# Patient Record
Sex: FEMALE | URBAN - METROPOLITAN AREA
[De-identification: names, ages, dates, MRNs, and addresses within clinical notes are randomized per-mention and may not be internally consistent; named-entity substitution may affect disease eponyms.]

---

## 2020-11-24 ENCOUNTER — APPOINTMENT (OUTPATIENT)
Dept: URBAN - METROPOLITAN AREA CLINIC 259 | Age: 75
Setting detail: DERMATOLOGY
End: 2020-11-25

## 2020-11-24 DIAGNOSIS — L29.8 OTHER PRURITUS: ICD-10-CM

## 2020-11-24 DIAGNOSIS — L82.0 INFLAMED SEBORRHEIC KERATOSIS: ICD-10-CM

## 2020-11-24 DIAGNOSIS — L57.8 OTHER SKIN CHANGES DUE TO CHRONIC EXPOSURE TO NONIONIZING RADIATION: ICD-10-CM

## 2020-11-24 DIAGNOSIS — L56.5 DISSEMINATED SUPERFICIAL ACTINIC POROKERATOSIS (DSAP): ICD-10-CM

## 2020-11-24 DIAGNOSIS — L57.0 ACTINIC KERATOSIS: ICD-10-CM

## 2020-11-24 DIAGNOSIS — D18.0 HEMANGIOMA: ICD-10-CM

## 2020-11-24 PROBLEM — D18.01 HEMANGIOMA OF SKIN AND SUBCUTANEOUS TISSUE: Status: ACTIVE | Noted: 2020-11-24

## 2020-11-24 PROCEDURE — OTHER LIQUID NITROGEN: OTHER

## 2020-11-24 PROCEDURE — OTHER PRESCRIPTION: OTHER

## 2020-11-24 PROCEDURE — 17110 DESTRUCT B9 LESION 1-14: CPT

## 2020-11-24 PROCEDURE — 99214 OFFICE O/P EST MOD 30 MIN: CPT | Mod: 25

## 2020-11-24 PROCEDURE — OTHER COUNSELING: OTHER

## 2020-11-24 PROCEDURE — 17000 DESTRUCT PREMALG LESION: CPT | Mod: 59

## 2020-11-24 RX ORDER — TRIAMCINOLONE ACETONIDE 1 MG/G
CREAM TOPICAL
Qty: 1 | Refills: 0 | Status: ERX | COMMUNITY
Start: 2020-11-24

## 2020-11-24 ASSESSMENT — LOCATION DETAILED DESCRIPTION DERM
LOCATION DETAILED: RIGHT SUPERIOR MEDIAL UPPER BACK
LOCATION DETAILED: SUPERIOR THORACIC SPINE
LOCATION DETAILED: LEFT MEDIAL UPPER BACK
LOCATION DETAILED: RIGHT PROXIMAL PRETIBIAL REGION
LOCATION DETAILED: RIGHT FOREHEAD
LOCATION DETAILED: LEFT PROXIMAL PRETIBIAL REGION
LOCATION DETAILED: MIDDLE STERNUM

## 2020-11-24 ASSESSMENT — LOCATION ZONE DERM
LOCATION ZONE: TRUNK
LOCATION ZONE: FACE
LOCATION ZONE: LEG

## 2020-11-24 ASSESSMENT — LOCATION SIMPLE DESCRIPTION DERM
LOCATION SIMPLE: LEFT UPPER BACK
LOCATION SIMPLE: RIGHT UPPER BACK
LOCATION SIMPLE: LEFT PRETIBIAL REGION
LOCATION SIMPLE: UPPER BACK
LOCATION SIMPLE: RIGHT PRETIBIAL REGION
LOCATION SIMPLE: RIGHT FOREHEAD
LOCATION SIMPLE: CHEST

## 2020-11-24 NOTE — PROCEDURE: LIQUID NITROGEN
Detail Level: Zone
Render Note In Bullet Format When Appropriate: No
Number Of Freeze-Thaw Cycles: 1 freeze-thaw cycle
Consent: The patient's consent was obtained including but not limited to risks of crusting, scabbing, blistering, scarring, darker or lighter pigmentary change, recurrence, incomplete removal and infection.
Medical Necessity Clause: This procedure was medically necessary because the lesions that were treated were:
Detail Level: Detailed
Post-Care Instructions: I reviewed with the patient in detail post-care instructions. Patient is to wear sunprotection, and avoid picking at any of the treated lesions. Pt may apply Vaseline to crusted or scabbing areas.
Duration Of Freeze Thaw-Cycle (Seconds): 5
Medical Necessity Information: It is in your best interest to select a reason for this procedure from the list below. All of these items fulfill various CMS LCD requirements except the new and changing color options.
Total Number Of Lesions Treated: 14
Render Post Care In The Note?: yes

## 2022-07-18 ENCOUNTER — TRANSCRIBE ORDERS (OUTPATIENT)
Dept: OTHER | Age: 77
End: 2022-07-18

## 2022-07-18 DIAGNOSIS — G51.0 BELL'S PALSY: Primary | ICD-10-CM

## 2022-07-18 DIAGNOSIS — R47.89 OTHER SPEECH DISTURBANCES: ICD-10-CM

## 2022-08-09 ENCOUNTER — TELEPHONE (OUTPATIENT)
Dept: SPEECH THERAPY | Facility: CLINIC | Age: 77
End: 2022-08-09

## 2022-08-09 NOTE — TELEPHONE ENCOUNTER
If this patient would like to reschedule her evaluation with Yandy Woodson from 9/2 to 8/10.  You can schedule her anywhere in the ENT block that is available per Yandy Woodson

## 2022-09-02 ENCOUNTER — THERAPY VISIT (OUTPATIENT)
Dept: SPEECH THERAPY | Facility: CLINIC | Age: 77
End: 2022-09-02
Attending: PHYSICIAN ASSISTANT
Payer: COMMERCIAL

## 2022-09-02 DIAGNOSIS — R13.11 ORAL PHASE DYSPHAGIA: ICD-10-CM

## 2022-09-02 DIAGNOSIS — R47.1 DYSARTHRIA: Primary | ICD-10-CM

## 2022-09-02 PROCEDURE — 92507 TX SP LANG VOICE COMM INDIV: CPT | Mod: GN | Performed by: SPEECH-LANGUAGE PATHOLOGIST

## 2022-09-02 PROCEDURE — 92522 EVALUATE SPEECH PRODUCTION: CPT | Mod: GN | Performed by: SPEECH-LANGUAGE PATHOLOGIST

## 2022-09-02 NOTE — DISCHARGE INSTRUCTIONS
[email or web address]    Eyelid weight placement     Management of Flaccid Stage    Flaccid Massage  The point massage is to make the muscles more pliable for exercise, improve circulation, and prevent fluid from building up the hardening.    Using the finger pads of your first and index fingers, use firm but gently pressure to massage the following areas of both sides of the face for the designated length of time:  Forehead- 2 minutes   Temples- 1 minute   Cheek- 3 minutes (upper cheek moving back towards the ear for 1 minute, mid cheek moving back towards the ear for 1 minute, and lower cheek moving back towards the ear for 1 minute)  Chin- 1 minute     Key Points As We Consider Exercise  We will never attempt to exercise an area that is not yet innervated (i.e., has no movement).  Exercises should be slow and controlled.   Facial palsy exercises are a marathon, not a sprint.    Prioritize quality over quantity.   Never exercise to the point of fatigue.   Exercises should not hurt or make you feel uncomfortable afterward.    Don't get discouraged!!      Eye Management:    You may be having difficulty closing one or both of your eyes.  The inability to blink, of blink fully, may cause your eye to be dry and sore.  This can be painful and blur your vision. If you do not care for your eye it can cause infection or ulcers which may have a permanent impact on your vision.  It is important for you to take care of your eye at this stage and you may want to wear glasses, sunglasses, and avoid air conditioning that can cause excessive dryness.      Your eye doctor or general practitioner should have prescribed you with drops or spray to manage dryness during the day and gel ointment for use at night.  You should follow their recommendations consistently.      It is also important to establish eye closure at night to allow you to sleep and in order to prevent dryness and possible damage.  To establish closure, you  may want to consider using the following:  Taping (consider using 3M transpore tape 2.10 inch tape, Nexcare Blue Sensitive Skin Tape, MedVance Silicone Tape)  Moisture chamber goggles   Adhesive eye patches     Eyelid Stretches- complete 2x/day as able  Actively close your eyes, and then look down at your knees.  Looking down may feel awkward as you are reversing the brain's protective mechanism of having the eye roll backward.    Hold the eye's closed for 20-30 seconds  Relax the lid back to the open eye position   Repeat 3x    Facial Exercises to Improve Eye Closure:    Active Assisted Eye Closure   Keep your head at chin level  Look down and keep looking down while you close your eyes  While closing both eyes, carefully and gently use a finger to assist the eyelid to completely close    Squeeze both eyes tightly shut for 5 seconds  Try to squeeze with your upper facial muscles only, keeping the lower face as relaxed as possible  Relax open   Repeat 10-20x; discontinue before 20 if the muscles feel tired    Exercises to Improve Lip Function     Active Assisted Pucker   Use the thumbs and index fingers to assist the lips into a symmetrical pucker at the same time that you use your muscles to pucker   WIth the lined area between the lips and nose centered with the bridge of the nose   Pretend to suck in through a skinny straw  Hold for 3-5 seconds  Relax   Repeat 10-20x; discontinue before 20 if the muscles feel tired

## 2022-09-02 NOTE — PROGRESS NOTES
Speech-Language Pathology Department   EVALUATION  [facility]  FACIAL PARALYSIS EVALUATION      09/02/22 1000   Visit Type   Visit Type Initial       Present No   General Patient Information   Start Of Care Date 09/02/22   Referring Physician Dominique Spivey PA-C   Orders Eval And Treat   Orders Date 07/18/22   Orders Comment Bell's palsy   Medical Diagnosis Other speech disturbances   Onset Of Illness/injury Or Date Of Surgery 06/17/22   Precautions/limitations No Known Precautions/limitations   Special Instructions None   Surgical/Medical History Reviewed Yes   Pertinent History Of Current Problem Pt is a 77 year old female with a 6/17/2022 onset of Bell's palsy.  Pt reports little improvement since that time.   Functional Problems Anterior loss (improving), pocketing (improving), biting lip/cheek, slurred speech, dry eye   Previous Treatment None   General Health Major life stress at onset of symptoms   Sensory Changes Food does not taste as good   Pain Description Not painful- eye bothersome   Hearing Changes Increased sensitivity    Eye Problems Dry eye   Patient's Concerns other (comment)  (that it might never go away)   Patient/Family Goals Try and improve the face; get rid of it period.   Evaluation Results: Resting Tone and Symmetry/Oral status   Palpebral Fissure - Type of Eye Tone  Normal  (0)   Nasolabial Fold  Less Pronounced  (1.0)   Lips  - Type of Lip Tone  Drooped  (1.0)   Area of Wrinkles Forehead;Eye;Cheek   Type of Forehead Wrinkles Less   Type of Eye Wrinkles Less   Type of Cheek/Mouth Wrinkles Less   Evaluation Results: Forehead Elevation   Evaluation Results: Forehead Elevation 1.0   Forehead Strength Rating % 0%   Forehead General Severity of Synkinesis   none   Evaluation Results: Minimal Effort Eye Closure    Evaluation Results: Minimal Effort Eye Closure  2.0   Complete No   Strength Rating % 10%   General Severity  of Synkinesis   none   Evaluation Results: Open Mouth Smile    Evaluation Results: Open Mouth Smile  1.0   Open Smile Strength Rating % 0%   Open Smile General Severity of Synkinesis   none   Evaluation Results: Closed Mouth Smile    Closed Mouth Smile Strength Rating % 0%   Closed Mouth Smile General Severity of Synkinesis   none   Evaluation Results: Snarl   Evaluation Results Snarl 1.0   Snarl Strength Rating % 0%   Snarl General Severity of Synkinesis   none   Evaluation Results: Pucker   Evaluation Results: Pucker 2.0   Strength Rating % very slight inferiorly   General Severity of Synkinesis   none   Evaluation Results: Tongue Movement   Tongue Protrusion Deviates to left   Evaluation Results: Speech Function   Evaluation Results: Speech Function Deviation of lips during speech to left, stronger side;Reduced lip movement on the right   General Severity of Synkinesis with Sound-Lip Rounding None   General Severity of Synkinesis with Sound-Lip Pressure none   General Therapy Interventions   Planned Therapy Interventions Oral Stage Swallowing Therapy;Facial Therapy;Improve Facial Tone and Function;Improve Speech Intelligibilty   Clinical Impressions   Criteria for Skilled Therapeutic Interventions Met yes;treatment indicated   Facial Grading Score 18/100   Communication Diagnosis Non-verbal communication impairment, mild dysarthria   Swallowing Diagnosis Mild oral phase dysphagia   Rehab Potential good to achieve stated therapy goal(s)   Demonstrates Need for Referral to ENT- Avel Lara   Therapy Frequency  2 times;per month  (for the first month, then monthly)   Predicted Duration of Therapy Intervention (days/weeks) up to 6 months   Risks and Benefits of Treatment have been explained yes   Patient, family and/or staff in agreement yes   Facial Paralysis Goals   Facial Paralysis Goals 1;2;3;4   Facial Paralysis Goal 1   Goal Identifier 1   Goal Description Pt will increase her FGS reflecting gains in resting  symmetry, symmetry of movement, and reduced synkinesis (if present) when compared to her status noted at the initial evaluation.   Goal Progress Goal ongoing.  FGS completed as part of the initial evaluation with pt scoring 18/100.  Pt trained on FGS, therapy as it relates to face, and sequale of recovery.  Pt reported understanding and agreement.   Target Date 12/01/22   Facial Paralysis Goal 2   Goal Identifier 2   Goal Description Pt will demonstrate complete eye closure when compared to her status of 4 mm open as measured during the initial evaluation.   Goal Progress Goal ongoing.  Pt trained on eyelid stretches x1 and eye closure exercises listed below.  Pt indpendent with execution upon session completion.   Target Date 12/01/22   Facial Paralysis Goal 3   Goal Identifier 3   Goal Description Pt will report a 25% improvement in articulartory precision when compared to her status at the initial evaluation.   Goal Progress Goal ongoing.  Pt trained on rate control and overarticulation as compensatory strategies.  Pt reported understanding and agreement.  Pt notes  has hearing loss so it is more important than ever to implement strategies.   Target Date 12/01/22   Facial Paralysis Goal 4   Goal Identifier 4   Goal Description Pt will increase her FaCE Instrument score by 10 points reflecting gains in physical functioning and acceptance when compared to her score taken during the initial evlaluation.   Goal Progress Goal ongoing.  FaCE Instrument administered with pt socring 26/100.   Target Date 12/01/22   Total Evaluation Time   Sound production (artic, phonology, apraxia, dysarthria) Minutes (86928) 60   Total Evaluation Time 60     Thank you for the referral of Alisson Roberts.  If you have any questions about this report, please contact me using the information below.        Yandy Woodson MS CCC-SLP  Speech Language Pathologist   Clinical Specialist Level 3    [facility]  Dept. of Otolaryngology  Department of Rehabilitation services  [address] [place] [id number]    Email: [email or web address]  Voicemail: [phone]  Gender Pronouns: she/her  Employed by [employer]

## 2022-09-02 NOTE — PROGRESS NOTES
Speech-Language Pathology Department   [facility]  [facility]  FACIAL PARALYSIS EVALUATION      09/02/22 1000   Visit Type   Visit Type Initial       Present No   General Patient Information   Start Of Care Date 09/02/22   Referring Physician Dominique Spivey PA-C   Orders Eval And Treat   Orders Date 07/18/22   Orders Comment Bell's palsy   Medical Diagnosis Other speech disturbances   Onset Of Illness/injury Or Date Of Surgery 06/17/22   Precautions/limitations No Known Precautions/limitations   Special Instructions None   Surgical/Medical History Reviewed Yes   Pertinent History Of Current Problem Pt is a 77 year old female with a 6/17/2022 onset of Bell's palsy.  Pt reports little improvement since that time.   Functional Problems Anterior loss (improving), pocketing (improving), biting lip/cheek, slurred speech, dry eye   Previous Treatment None   General Health Major life stress at onset of symptoms   Sensory Changes Food does not taste as good   Pain Description Not painful- eye bothersome   Hearing Changes Increased sensitivity    Eye Problems Dry eye   Patient's Concerns other (comment)  (that it might never go away)   Patient/Family Goals Try and improve the face; get rid of it period.   Evaluation Results: Resting Tone and Symmetry/Oral status   Palpebral Fissure - Type of Eye Tone  Normal  (0)   Nasolabial Fold  Less Pronounced  (1.0)   Lips  - Type of Lip Tone  Drooped  (1.0)   Area of Wrinkles Forehead;Eye;Cheek   Type of Forehead Wrinkles Less   Type of Eye Wrinkles Less   Type of Cheek/Mouth Wrinkles Less   Evaluation Results: Forehead Elevation   Evaluation Results: Forehead Elevation 1.0   Forehead Strength Rating % 0%   Forehead General Severity of Synkinesis   none   Evaluation Results: Minimal Effort Eye Closure    Evaluation Results: Minimal Effort Eye Closure  2.0   Complete No   Strength Rating % 10%   General Severity  of Synkinesis   none   Evaluation Results: Open Mouth Smile    Evaluation Results: Open Mouth Smile  1.0   Open Smile Strength Rating % 0%   Open Smile General Severity of Synkinesis   none   Evaluation Results: Closed Mouth Smile    Closed Mouth Smile Strength Rating % 0%   Closed Mouth Smile General Severity of Synkinesis   none   Evaluation Results: Snarl   Evaluation Results Snarl 1.0   Snarl Strength Rating % 0%   Snarl General Severity of Synkinesis   none   Evaluation Results: Pucker   Evaluation Results: Pucker 2.0   Strength Rating % very slight inferiorly   General Severity of Synkinesis   none   Evaluation Results: Tongue Movement   Tongue Protrusion Deviates to left   Evaluation Results: Speech Function   Evaluation Results: Speech Function Deviation of lips during speech to left, stronger side;Reduced lip movement on the right   General Severity of Synkinesis with Sound-Lip Rounding None   General Severity of Synkinesis with Sound-Lip Pressure none   General Therapy Interventions   Planned Therapy Interventions Oral Stage Swallowing Therapy;Facial Therapy;Improve Facial Tone and Function;Improve Speech Intelligibilty   Clinical Impressions   Criteria for Skilled Therapeutic Interventions Met yes;treatment indicated   Facial Grading Score 18/100   Communication Diagnosis Non-verbal communication impairment, mild dysarthria   Swallowing Diagnosis Mild oral phase dysphagia   Rehab Potential good to achieve stated therapy goal(s)   Demonstrates Need for Referral to ENT- Avel Lara   Therapy Frequency  2 times;per month  (for the first month, then monthly)   Predicted Duration of Therapy Intervention (days/weeks) up to 6 months   Risks and Benefits of Treatment have been explained yes   Patient, family and/or staff in agreement yes   Facial Paralysis Goals   Facial Paralysis Goals 1;2;3;4   Facial Paralysis Goal 1   Goal Identifier 1   Goal Description Pt will increase her FGS reflecting gains in resting  symmetry, symmetry of movement, and reduced synkinesis (if present) when compared to her status noted at the initial evaluation.   Target Date 12/01/22   Facial Paralysis Goal 2   Goal Identifier 2   Goal Description Pt will demonstrate complete eye closure when compared to her status of 4 mm open as measured during the initial evaluation.   Target Date 12/01/22   Facial Paralysis Goal 3   Goal Identifier 3   Goal Description Pt will report a 25% improvement in articulartory precision when compared to her status at the initial evaluation.   Target Date 12/01/22   Facial Paralysis Goal 4   Goal Identifier 4   Goal Description Pt will increase her FaCE Instrument score by 10 points reflecting gains in physical functioning and acceptance when compared to her score taken during the initial evlaluation.     Thank you for the referral of Alisson Roberts.  If you have any questions about this report, please contact me using the information below.        Yandy Woodson MS CCC-SLP  Speech Language Pathologist   Clinical Specialist Level 3    [facility]  Dept. of Otolaryngology  Department of Rehabilitation services  [address] [place] [id number]    Email: [email or web address]  Voicemail: [phone]  Gender Pronouns: she/her  Employed by [employer]

## 2022-09-08 NOTE — TELEPHONE ENCOUNTER
FUTURE VISIT INFORMATION      FUTURE VISIT INFORMATION:    Date: 9/28/2022    Time: 10:20am    Location: [facility]   REFERRAL INFORMATION:    Referring provider:  Yandy Woodson, SLP    Referring providers clinic:  [facility]    Reason for visit/diagnosis  NEW SUBSPEC FACIAL PARALYSIS    RECORDS REQUESTED FROM:       Clinic name Comments Records Status Imaging Status   [facility]  9/2/2022 - note and referral from  Yandy Woodson, SLP [facility]    [address] [place] [address]    [phone]   6/17/2022- Note from Joslyn Grossman MD        Care everywhere

## 2022-09-16 ENCOUNTER — THERAPY VISIT (OUTPATIENT)
Dept: SPEECH THERAPY | Facility: CLINIC | Age: 77
End: 2022-09-16
Payer: COMMERCIAL

## 2022-09-16 DIAGNOSIS — R47.1 DYSARTHRIA: Primary | ICD-10-CM

## 2022-09-16 DIAGNOSIS — R13.11 ORAL PHASE DYSPHAGIA: ICD-10-CM

## 2022-09-16 PROCEDURE — 92507 TX SP LANG VOICE COMM INDIV: CPT | Mod: GN | Performed by: SPEECH-LANGUAGE PATHOLOGIST

## 2022-09-16 NOTE — DISCHARGE INSTRUCTIONS
Management of Flaccid Stage     Flaccid Massage  The point massage is to make the muscles more pliable for exercise, improve circulation, and prevent fluid from building up the hardening.    Using the finger pads of your first and index fingers, use firm but gently pressure to massage the following areas of both sides of the face for the designated length of time:  Forehead- 2 minutes   Temples- 1 minute   Cheek- 3 minutes (upper cheek moving back towards the ear for 1 minute, mid cheek moving back towards the ear for 1 minute, and lower cheek moving back towards the ear for 1 minute)  Chin- 1 minute       Eye Management:    Try Glad Press and Seal to create a moisture chamber for your eye at night.       Eyelid Stretches  Use your fingers (or an eyelash curler) on your top eyelashes, and gently pull your top lid down over the lower lid.    Use your middle finger (or your other hand's finger) to then stretch your eyebrow up a little.  Hold the lid stretched for 20-30 seconds   Release the lid and then, relax the lid back to the open eye position  Repeat 3x    2. Eyelid Stretches  Actively close your eyes, and then look down at your knees.  Looking down may feel awkward as you are reversing the brain's protective mechanism of having the eye roll backward.    Hold the eye's closed for 20-30 seconds  Relax the lid back to the open eye position   Repeat 3x     Facial Exercises to Improve Eye Closure:     Active Assisted to Active Eye Closure  Keep your head at chin level  Look down and keep looking down while you close your eyes  While closing both eyes, carefully and gently use a finger to assist the eyelid to completely close    Squeeze both eyes tightly shut for 5 seconds  Try to squeeze with your upper facial muscles only, keeping the lower face as relaxed as possible  Remove the finger and keep squeezing the eyes shut for an addition 3-5 seconds   Relax open   Repeat 10-20x; discontinue before 20 if the muscles  feel tired      Exercises to Improve Lip Function      Active Assisted Pucker   Use the thumbs and index fingers to assist the lips into a symmetrical pucker at the same time that you use your muscles to pucker   WIth the lined area between the lips and nose centered with the bridge of the nose   Pretend to suck in through a skinny straw  Hold for 3-5 seconds  Relax   Repeat 10-20x; discontinue before 20 if the muscles feel tired

## 2022-09-28 ENCOUNTER — OFFICE VISIT (OUTPATIENT)
Dept: OTOLARYNGOLOGY | Facility: CLINIC | Age: 77
End: 2022-09-28
Payer: COMMERCIAL

## 2022-09-28 ENCOUNTER — PRE VISIT (OUTPATIENT)
Dept: OTOLARYNGOLOGY | Facility: CLINIC | Age: 77
End: 2022-09-28

## 2022-09-28 VITALS
HEART RATE: 87 BPM | SYSTOLIC BLOOD PRESSURE: 152 MMHG | BODY MASS INDEX: 24.27 KG/M2 | WEIGHT: 137 LBS | DIASTOLIC BLOOD PRESSURE: 69 MMHG | HEIGHT: 63 IN

## 2022-09-28 DIAGNOSIS — F41.9 ANXIETY: ICD-10-CM

## 2022-09-28 DIAGNOSIS — H53.483 VISUAL FIELD CONTRACTION, BILATERAL: ICD-10-CM

## 2022-09-28 DIAGNOSIS — G51.9 SEVENTH CRANIAL NERVE DISEASE OR SYNDROME: Primary | ICD-10-CM

## 2022-09-28 DIAGNOSIS — H57.819 BROW PTOSIS: ICD-10-CM

## 2022-09-28 DIAGNOSIS — H02.231 PARALYTIC LAGOPHTHALMOS OF RIGHT UPPER EYELID: ICD-10-CM

## 2022-09-28 PROCEDURE — 99204 OFFICE O/P NEW MOD 45 MIN: CPT | Performed by: OTOLARYNGOLOGY

## 2022-09-28 RX ORDER — ASPIRIN 81 MG/1
81 TABLET, CHEWABLE ORAL
COMMUNITY

## 2022-09-28 RX ORDER — LORAZEPAM 0.5 MG/1
0.5 TABLET ORAL ONCE
Qty: 1 TABLET | Refills: 0 | Status: SHIPPED | OUTPATIENT
Start: 2022-09-28 | End: 2022-09-28

## 2022-09-28 RX ORDER — ATORVASTATIN CALCIUM 40 MG/1
40 TABLET, FILM COATED ORAL
COMMUNITY

## 2022-09-28 RX ORDER — GABAPENTIN 100 MG/1
100 CAPSULE ORAL
COMMUNITY

## 2022-09-28 RX ORDER — ROSUVASTATIN CALCIUM 40 MG/1
40 TABLET, COATED ORAL DAILY
COMMUNITY
Start: 2022-07-07

## 2022-09-28 RX ORDER — NITROFURANTOIN 25; 75 MG/1; MG/1
CAPSULE ORAL
COMMUNITY
Start: 2022-09-06

## 2022-09-28 RX ORDER — IBUPROFEN 200 MG
200-600 TABLET ORAL
COMMUNITY

## 2022-09-28 RX ORDER — LEVOTHYROXINE SODIUM 88 UG/1
TABLET ORAL
COMMUNITY
Start: 2022-09-09

## 2022-09-28 RX ORDER — PREDNISONE 10 MG/1
TABLET ORAL
COMMUNITY
Start: 2022-06-27

## 2022-09-28 ASSESSMENT — PAIN SCALES - GENERAL: PAINLEVEL: NO PAIN (0)

## 2022-09-28 NOTE — PROGRESS NOTES
Facial Plastic and Reconstructive Surgery Consultation    Referring Provider:   Referred Self, MD  No address on file    HPI:   I had the pleasure of seeing Alisson Roberts today in clinic for consultation for right facial paralysis. Alisson Roberts is a 77 year old female who in June 20, 2022, suffered sudden onset right complete paralysis. She was diagnosed with Bell's and was treated with prednisone anti-virals. She did not have imaging at that time. She has been seeing Yandy Woodson and has had little recovery. She notes improvement in tone. She has no blink and incomplete eyelid closure. She uses drops and ointment, but states she does not make any tears.     She feels discouraged as she has not had significant improvement. She uses drops and ointment and seals her eye at night. She has difficulty with speech and eating but does believe that this is improving.     Of note she also describes significant trouble with visual field obstruction. 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 She notes now with the paralysis that her brow is down and she had hooding that gets in the way of sight. If she supports her brow she has improvement.     She also notes right nasal valve collapse and has been using breathe right strips at night.       Review Of Systems  ROS: 10 point ROS neg other than the symptoms noted above in the HPI.    Patient Active Problem List   Diagnosis     Lumbago     No past surgical history on file.  Current Outpatient Medications   Medication Sig Dispense Refill     aspirin (ASA) 81 MG chewable tablet Take 81 mg by mouth       atorvastatin (LIPITOR) 40 MG tablet Take 40 mg by mouth       gabapentin (NEURONTIN) 100 MG capsule Take 100 mg by mouth       ibuprofen (ADVIL/MOTRIN) 200 MG tablet Take 200-600 mg by mouth       levothyroxine (SYNTHROID/LEVOTHROID) 88 MCG tablet TAKE 1 TABLET BY MOUTH ONCE DAILY IN THE MORNING ON AN EMPTY STOMACH        nitroFURantoin macrocrystal-monohydrate (MACROBID) 100 MG capsule TAKE 1 CAPSULE BY MOUTH EVERY 12 HOURS FOR 5 DAYS       predniSONE (DELTASONE) 10 MG tablet TAKE 3 TABLETS BY MOUTH ONCE DAILY FOR 3 DAYS THEN 2 ONCE DAILY FOR 3 DAYS THEN 1 ONCE DAILY FOR 3 DAYS FOR A TOTAL OF 9 DAYS.       rosuvastatin (CRESTOR) 40 MG tablet Take 40 mg by mouth daily       Patient has no known allergies.  Social History     Socioeconomic History     Marital status: Unknown     Spouse name: Not on file     Number of children: Not on file     Years of education: Not on file     Highest education level: Not on file   Occupational History     Not on file   Tobacco Use     Smoking status: Not on file     Smokeless tobacco: Not on file   Substance and Sexual Activity     Alcohol use: Not on file     Drug use: Not on file     Sexual activity: Not on file   Other Topics Concern     Not on file   Social History Narrative     Not on file     Social Determinants of Health     Financial Resource Strain: Not on file   Food Insecurity: Not on file   Transportation Needs: Not on file   Physical Activity: Not on file   Stress: Not on file   Social Connections: Not on file   Intimate Partner Violence: Not on file   Housing Stability: Not on file     No family history on file.    PE:  Alert and Oriented, Answering Questions Appropriately  Atraumatic, Normocephalic, Face asymmetric at rest with complete right facial paralysis with no voluntary movement.   Skin: Flanagan 2  Facial Nerve Intact on left, right with complete facial paralysis with no voluntary movement  EOM's, PEERL, postive bell's phenomenon on right with no blink, evidence of 4mm of scleral show with attempt of eye closure, loss of tone in lower eyelid, the right brow cannot be elevated due to paralysis, the brow is resting below the orbital rim, if this is supported she can see much better  Nasal Exam: right lateral wall collapse, support of cheek with hand leads to improvement in  obstruction   Dermatochalasis with excess skin touching the eyelids in both eyes, worse on the right   Lids resting on eyelashes obstructing the temporal visual axis  Chin: Normal   Lips/Teeth/Toungue/Gums: Lips intact, Normal Dentition, Occlusion intact, Oral mucosa intact, no lesions/ulcerations/masses, Tongue mobile  Chest: No wheezing, cyanosis, or stridor  Card: Normal upper extremity pulses and capillary refill, not diaphoretic  Neuro/Psych: CN's 2-12 intact except for right facial nerve paralysis, Moves all extremities, ambulation in intact, positive affect, no notable muscle weakness            IMPRESSION:    Right idiopathic facial nerve paralysis  Right paralytic lagophthalmos  Right lowe eyelid ectropion  Right brow ptosis  Bilateral dermatochalasis upper eyelids      PLAN:      -MRI due to no recovery at 3 months  -visual field testing    Surgery:  Right brow elevation  Right upper eyelid weight  Bilateral upper eyelid blepharoplasty    Discussed all of the above with the patient, discussed surgery, discussed anticipated timeline of facial nerve recovery.       Photodocumentation was obtained.     I spent a total of 45 minutes in the care of patient Alisson Roberts during today's office visit. This time includes reviewing the patient's chart and prior history, obtaining a history, performing an examination and evaluation and counseling the patient. This time also includes ordering mediations or tests necessary in addition to communication to other member's of the patient's health care team. Time spent in documentation and care coordination is included.

## 2022-09-28 NOTE — LETTER
9/28/2022       RE: Alisson Roberts  [address]  [facility] [address]     Dear Colleague,    Thank you for referring your patient, Alisson Roberts, to the [facility] [place] [facility]. Please see a copy of my visit note below.    Facial Plastic and Reconstructive Surgery Consultation    Referring Provider:   Referred Self, MD  No address on file    HPI:   I had the pleasure of seeing Alisson Roberts today in clinic for consultation for right facial paralysis. Alisson Roberts is a 77 year old female who in June 20, 2022, suffered sudden onset right complete paralysis. She was diagnosed with Bell's and was treated with prednisone anti-virals. She did not have imaging at that time. She has been seeing Yandy Woodson and has had little recovery. She notes improvement in tone. She has no blink and incomplete eyelid closure. She uses drops and ointment, but states she does not make any tears.     She feels discouraged as she has not had significant improvement. She uses drops and ointment and seals her eye at night. She has difficulty with speech and eating but does believe that this is improving.     Of note she also describes significant trouble with visual field obstruction. 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 She notes now with the paralysis that her brow is down and she had hooding that gets in the way of sight. If she supports her brow she has improvement.     She also notes right nasal valve collapse and has been using breathe right strips at night.       Review Of Systems  ROS: 10 point ROS neg other than the symptoms noted above in the HPI.    Patient Active Problem List   Diagnosis     Lumbago     No past surgical history on file.  Current Outpatient Medications   Medication Sig Dispense Refill     aspirin (ASA) 81 MG chewable tablet Take 81 mg by mouth        atorvastatin (LIPITOR) 40 MG tablet Take 40 mg by mouth       gabapentin (NEURONTIN) 100 MG capsule Take 100 mg by mouth       ibuprofen (ADVIL/MOTRIN) 200 MG tablet Take 200-600 mg by mouth       levothyroxine (SYNTHROID/LEVOTHROID) 88 MCG tablet TAKE 1 TABLET BY MOUTH ONCE DAILY IN THE MORNING ON AN EMPTY STOMACH       nitroFURantoin macrocrystal-monohydrate (MACROBID) 100 MG capsule TAKE 1 CAPSULE BY MOUTH EVERY 12 HOURS FOR 5 DAYS       predniSONE (DELTASONE) 10 MG tablet TAKE 3 TABLETS BY MOUTH ONCE DAILY FOR 3 DAYS THEN 2 ONCE DAILY FOR 3 DAYS THEN 1 ONCE DAILY FOR 3 DAYS FOR A TOTAL OF 9 DAYS.       rosuvastatin (CRESTOR) 40 MG tablet Take 40 mg by mouth daily       Patient has no known allergies.  Social History     Socioeconomic History     Marital status: Unknown     Spouse name: Not on file     Number of children: Not on file     Years of education: Not on file     Highest education level: Not on file   Occupational History     Not on file   Tobacco Use     Smoking status: Not on file     Smokeless tobacco: Not on file   Substance and Sexual Activity     Alcohol use: Not on file     Drug use: Not on file     Sexual activity: Not on file   Other Topics Concern     Not on file   Social History Narrative     Not on file     Social Determinants of Health     Financial Resource Strain: Not on file   Food Insecurity: Not on file   Transportation Needs: Not on file   Physical Activity: Not on file   Stress: Not on file   Social Connections: Not on file   Intimate Partner Violence: Not on file   Housing Stability: Not on file     No family history on file.    PE:  Alert and Oriented, Answering Questions Appropriately  Atraumatic, Normocephalic, Face asymmetric at rest with complete right facial paralysis with no voluntary movement.   Skin: Flanagan 2  Facial Nerve Intact on left, right with complete facial paralysis with no voluntary movement  EOM's, PEERL, postive bell's phenomenon on right with no blink,  evidence of 4mm of scleral show with attempt of eye closure, loss of tone in lower eyelid, the right brow cannot be elevated due to paralysis, the brow is resting below the orbital rim, if this is supported she can see much better  Nasal Exam: right lateral wall collapse, support of cheek with hand leads to improvement in obstruction   Dermatochalasis with excess skin touching the eyelids in both eyes, worse on the right   Lids resting on eyelashes obstructing the temporal visual axis  Chin: Normal   Lips/Teeth/Toungue/Gums: Lips intact, Normal Dentition, Occlusion intact, Oral mucosa intact, no lesions/ulcerations/masses, Tongue mobile  Chest: No wheezing, cyanosis, or stridor  Card: Normal upper extremity pulses and capillary refill, not diaphoretic  Neuro/Psych: CN's 2-12 intact except for right facial nerve paralysis, Moves all extremities, ambulation in intact, positive affect, no notable muscle weakness            IMPRESSION:    Right idiopathic facial nerve paralysis  Right paralytic lagophthalmos  Right lowe eyelid ectropion  Right brow ptosis  Bilateral dermatochalasis upper eyelids      PLAN:      -MRI due to no recovery at 3 months  -visual field testing    Surgery:  Right brow elevation  Right upper eyelid weight  Bilateral upper eyelid blepharoplasty    Discussed all of the above with the patient, discussed surgery, discussed anticipated timeline of facial nerve recovery.       Photodocumentation was obtained.     I spent a total of 45 minutes in the care of patient Alisson Roberts during today's office visit. This time includes reviewing the patient's chart and prior history, obtaining a history, performing an examination and evaluation and counseling the patient. This time also includes ordering mediations or tests necessary in addition to communication to other member's of the patient's health care team. Time spent in documentation and care coordination is included.             Again, thank you for  allowing me to participate in the care of your patient.      Sincerely,    Alayna Lai MD

## 2022-09-28 NOTE — LETTER
Date:September 29, 2022      Patient was self referred, no letter generated. Do not send.        [facility] Health Information

## 2022-09-28 NOTE — NURSING NOTE
Photo and video documentation obtained.    Orders placed for MR of Brain and Face.  Pt requesting a one time dose of Ativan PO prior to imaging; prescription for one time dose sent to pt's pharmacy.    Pt needs VFT completed to obtain PA for bilateral upper eyelid blepharoplasty and right brow lift.    Will work to coordinate MR imaging and VFT on same day.    Joslyn Warner RN  9/28/2022 1:56 PM

## 2022-09-29 ENCOUNTER — TELEPHONE (OUTPATIENT)
Dept: OTOLARYNGOLOGY | Facility: CLINIC | Age: 77
End: 2022-09-29

## 2022-09-29 NOTE — TELEPHONE ENCOUNTER
Spoke with patient about scheduling MRIs ordered by Bj Lai. Also infomred patient od request to schedule MRIs on same day as visual field test. Patient stated she is going to think about this for a while prior to scheduling. Stated she wanted to consult with other people and would call back to schedule on her own time. Confirmed patient did not want to schedule something now, even if it would be far into the future. Confirmed patient would call and set up appointment on her own if she changed her mind.

## 2022-09-30 ENCOUNTER — TELEPHONE (OUTPATIENT)
Dept: OTOLARYNGOLOGY | Facility: CLINIC | Age: 77
End: 2022-09-30

## 2022-09-30 ENCOUNTER — TELEPHONE (OUTPATIENT)
Dept: OPHTHALMOLOGY | Facility: CLINIC | Age: 77
End: 2022-09-30

## 2022-09-30 NOTE — TELEPHONE ENCOUNTER
Spoke with patient regarding scheduling for a TECH ONLY Appointment for: VFT for Ming patient. Patient has decided to put off surgery and declined scheduling at this time. Patient will call for scheduling as needed in the future.-Per Patient

## 2022-10-03 ENCOUNTER — TELEPHONE (OUTPATIENT)
Dept: OTOLARYNGOLOGY | Facility: CLINIC | Age: 77
End: 2022-10-03

## 2022-10-03 NOTE — TELEPHONE ENCOUNTER
Called patient to follow up on coordinating schedules to have the MRI and VFT completed on the same day.    Pt wants to wait on all diagnostic testing at this time, including the MRI.  She wants to think about things prior to making a decision on surgery or imaging.    I asked pt if she was interested in pursuing the eyelid weight surgery and waiting on the browlift and blepharoplasty, and she said she wants to wait on all surgery at this time.    She said she will call when/if she decides to proceed with imaging and prior auth for surgery.    Pt has my direct dial for call back and said she will call if she chooses to proceed.    Joslyn Warner RN  10/3/2022 11:29 AM

## 2022-10-05 ENCOUNTER — THERAPY VISIT (OUTPATIENT)
Dept: SPEECH THERAPY | Facility: CLINIC | Age: 77
End: 2022-10-05
Payer: COMMERCIAL

## 2022-10-05 DIAGNOSIS — R47.1 DYSARTHRIA: Primary | ICD-10-CM

## 2022-10-05 DIAGNOSIS — R13.11 ORAL PHASE DYSPHAGIA: ICD-10-CM

## 2022-10-05 PROCEDURE — 92507 TX SP LANG VOICE COMM INDIV: CPT | Mod: GN | Performed by: SPEECH-LANGUAGE PATHOLOGIST

## 2022-10-05 NOTE — DISCHARGE INSTRUCTIONS
Management of Dry Mouth   Saline Gargle  1/4 tsp baking soda and 1/4 tsp salt in 1 cup of water   Gargle     Inhale Steam-- breathe in through your mouth  Breathe over a pot of boiling water   Breathe in the steam of a hot shower     Flaccid Massage  The point massage is to make the muscles more pliable for exercise, improve circulation, and prevent fluid from building up the hardening.    Using the finger pads of your first and index fingers, use firm but gently pressure to massage the following areas of both sides of the face for the designated length of time:  Forehead- 2 minutes   Temples- 1 minute   Cheek- 3 minutes (upper cheek moving back towards the ear for 1 minute, mid cheek moving back towards the ear for 1 minute, and lower cheek moving back towards the ear for 1 minute)  Chin- 1 minute          Eyelid Stretches  Use your fingers (or an eyelash curler) on your top eyelashes, and gently pull your top lid down over the lower lid.    Use your middle finger (or your other hand's finger) to then stretch your eyebrow up a little.  Hold the lid stretched for 20-30 seconds   Release the lid and then, relax the lid back to the open eye position  Repeat 3x     2. Eyelid Stretches  Actively close your eyes, and then look down at your knees.  Looking down may feel awkward as you are reversing the brain's protective mechanism of having the eye roll backward.    Hold the eye's closed for 20-30 seconds  Relax the lid back to the open eye position   Repeat 3x     Facial Exercises to Improve Eye Closure:     Active Assisted to Active Eye Closure  Keep your head at chin level  Look down and keep looking down while you close your eyes  While closing both eyes, carefully and gently use a finger to assist the eyelid to completely close    Squeeze both eyes tightly shut for 5 seconds  Try to squeeze with your upper facial muscles only, keeping the lower face as relaxed as possible  Remove the finger and keep squeezing the  eyes shut for an addition 3-5 seconds   Relax open   Repeat 10-20x; discontinue before 20 if the muscles feel tired      Exercises to Improve Lip Function      Active Assisted Pucker   Use the thumbs and index fingers to assist the lips into a symmetrical pucker at the same time that you use your muscles to pucker   WIth the lined area between the lips and nose centered with the bridge of the nose   Pretend to suck in through a skinny straw  Hold for 3-5 seconds  Relax   Repeat 10-20x; discontinue before 20 if the muscles feel tired    Exercises to Improve Smile     Active Assisted Snarl   Using one finger placed flat and vertically next to nose (with the tip of the finger even with the top of the nostril  Gently assist this area to move straight upward while you use your muscles on both sides to make a  stinky face   Wrinkle the nose hard, trying to expose your upper teeth or gums equally on both sides  Hold for 5 seconds  Relax   Repeat 10-20x; discontinue before 20 if the muscles feel tired

## 2022-11-16 ENCOUNTER — THERAPY VISIT (OUTPATIENT)
Dept: SPEECH THERAPY | Facility: CLINIC | Age: 77
End: 2022-11-16
Payer: COMMERCIAL

## 2022-11-16 DIAGNOSIS — R47.1 DYSARTHRIA: Primary | ICD-10-CM

## 2022-11-16 DIAGNOSIS — R13.11 ORAL PHASE DYSPHAGIA: ICD-10-CM

## 2022-11-16 PROCEDURE — 92507 TX SP LANG VOICE COMM INDIV: CPT | Mod: GN | Performed by: SPEECH-LANGUAGE PATHOLOGIST

## 2022-11-16 NOTE — DISCHARGE INSTRUCTIONS
Flaccid Massage  The point massage is to make the muscles more pliable for exercise, improve circulation, and prevent fluid from building up the hardening.    Using the finger pads of your first and index fingers, use firm but gently pressure to massage the following areas of both sides of the face for the designated length of time:  Forehead- 2 minutes   Temples- 1 minute   Cheek- 3 minutes (upper cheek moving back towards the ear for 1 minute, mid cheek moving back towards the ear for 1 minute, and lower cheek moving back towards the ear for 1 minute)  Chin- 1 minute          Eyelid Stretch  Use your fingers (or an eyelash curler) on your top eyelashes, and gently pull your top lid down over the lower lid.    Use your middle finger (or your other hand's finger) to then stretch your eyebrow up a little.  Hold the lid stretched for 20-30 seconds   Release the lid and then, relax the lid back to the open eye position  Repeat 3x     Facial Exercises to Improve Eye Closure:     Active Assisted to Active Eye Closure  Keep your head at chin level  Look down and keep looking down while you close your eyes  While closing both eyes, carefully and gently use a finger to assist the eyelid to completely close    Squeeze both eyes tightly shut for 3 seconds  Try to squeeze with your upper facial muscles only, keeping the lower face as relaxed as possible  Remove the finger and keep squeezing the eyes shut for an addition 5 seconds   Relax open   Repeat 10-20x; discontinue before 20 if the muscles feel tired      Exercises to Improve Lip Function      Active Assisted Pucker   Use the thumbs and index fingers to assist the lips into a symmetrical pucker at the same time that you use your muscles to pucker   WIth the lined area between the lips and nose centered with the bridge of the nose   Pretend to suck in through a skinny straw  Hold for 3-5 seconds  Relax   Repeat 10-20x; discontinue before 20 if the muscles feel tired      Exercises to Improve Smile      Active Assisted Snarl   Using one finger placed flat and vertically next to nose (with the tip of the finger even with the top of the nostril  Gently assist this area to move straight upward while you use your muscles on both sides to make a  stinky face   Wrinkle the nose hard, trying to expose your upper teeth or gums equally on both sides  Hold for 5 seconds  Relax   Repeat 10-20x; discontinue before 20 if the muscles feel tired    Active Assisted Smile-- think light, loose and lift   Place four fingers or knuckles on an angle from the corner of the mouth up toward the cheek bone  Smile big on both sides, and at the same time, gently assist the smile on the affected side to look like the unaffected side   Hold for 3-5 seconds   Relax   Repeat 10-20x; discontinue before 20 if the muscles feel tired    What does it feel like to be smiling-- what does it feel like through the R cheek     Stinky Smile  or Snarl+Smile-- relax through the upper face!!    Place one finger flat and vertically next to your nose   Gently assist this area to move straight upward WHILE you use your muscles on both sides to make a  stinky  face   Wrinkle your nose hard, trying to expose your upper teeth or gums equally on both sides   Then, tilt the finger approximately 45 degrees so that you are snarling and half smiling on the affected side   Keep scrunching your nose and lifting your upper lip   Hold for 5 seconds   Relax   Repeat 10-20x; discontinue before 20 if the muscles feel tired

## 2023-01-06 ENCOUNTER — THERAPY VISIT (OUTPATIENT)
Dept: SPEECH THERAPY | Facility: CLINIC | Age: 78
End: 2023-01-06
Payer: COMMERCIAL

## 2023-01-06 DIAGNOSIS — R47.1 DYSARTHRIA: Primary | ICD-10-CM

## 2023-01-06 DIAGNOSIS — R13.11 ORAL PHASE DYSPHAGIA: ICD-10-CM

## 2023-01-06 PROCEDURE — 92507 TX SP LANG VOICE COMM INDIV: CPT | Mod: GN | Performed by: SPEECH-LANGUAGE PATHOLOGIST

## 2023-01-06 NOTE — DISCHARGE INSTRUCTIONS
Synkinesis    Time to take off the eye patch-- keep the Glad Press and Seal as you feel comfortable.      Massage, Relaxation, Strategies to Reduce Synkinesis     Wheel Massage   Consider your face a wheel and you are massaging the spokes of a wheel, from the outer rim to the center .  In that way, you will massage the entire side of your face in sections.  As you move along, if you find painful points, maintain the pressure on it until the pain diminishes, then continue on with the stretch.  For the best massage technique, you will want to use the hand that is opposite of your facial tightness.  For each section, stretch slowly, for ~8-10 seconds per area.  You will repeat each section three times in each muscle section or go around the half [place] of stretches 3 times (one group at a time).    For the first section, place your thumb in your mouth in front of your upper teeth and two or three fingers on the nose   Pull the tissue down with two or three fingers until you meet your thumb   Press together (from the inside and outside) while you stretch your face toward the center of your lips.    For this section, pull in a slight arc around your nostril and pull all the way through the lip.    The second section t is located under your eye, but do NOT pull the lower lid down  Start below the eye and pull straight towards the center of the lips   The third section is located at the temple where you will pull downward toward the center of the lips   For the fourth section, is located in front of your ear   Pull horizontally to the center of the lips   The final section is located under the jaw but more towards the affected side  Pull upward to the center of the lips   The thumb is inside between the front of the lower teeth and inside the chin     Go Blah  Active Facial Relaxation  With this strategy, you are going to use your mind to relax your face.    Allow your jaw to drop down as if it is hanging like a hammock  between two trees   Allow your back teeth to part slightly   Open your lips slightly as it feels comfortable   Imaging your entire face is heavy and hanging downward   Consider using relaxing visual imagery to help   Relax in this manner for 5-10 seconds intermittently throughout the day      The Hook  Buccinator Massage   Place the thumb (if the unaffected side) deep inside your cheek in a location that you might think is particularly tight   Using your thumb, stretch the muscle outward, pusing it in the direction away from your teeth   Like a hook   Hold the stretch for 10-15 seconds   Relax the facial muscles   Repeat 3-5x  Consider stretching the same spot or a few different sports in the same tight cheek area    Eyebrow Stretch   Use a finger to press ON the brow at the end closest to the nose  Slowly press and pull outward toward the temple   Pause on any sore spots along the way until the discomfort begins to diminish   Continue outward all the way to the temple   Repeat 1-3x   Use a finger to press ABOVE the brow at the end closest to the nose  Slowly press and pull outward toward the temple   Pause on any sore spots along the way until the discomfort begins to diminish   Continue outward all the way to the temple   Repeat 1-3x   Use a finger to press BELOW the brow at the end closest to the nose  Slowly press and pull outward toward the temple   Pause on any sore spots along the way until the discomfort begins to diminish   Continue outward all the way to the temple   Repeat 1-3x     Around the Eye   Using pointer and middle fingers of both hands, start in the center of your eyebrow and pull away   Repeat 3x  Move to the temple, and using the same fingers, pull up towrads the ceiling and down to the floor   Repeat 3x  Move under the eye in the center, and using the same fingers, pull away  Repeat 3x      Exercises to Improve Lip Function      Active Assisted Pucker   Use the thumbs and index fingers to  assist the lips into a symmetrical pucker at the same time that you use your muscles to pucker   WIth the lined area between the lips and nose centered with the bridge of the nose   Pretend to suck in through a skinny straw  Hold for 3-5 seconds  Relax   Repeat 10-20x; discontinue before 20 if the muscles feel tired     Exercises to Improve Smile      Active Assisted Snarl   Using one finger placed flat and vertically next to nose (with the tip of the finger even with the top of the nostril  Gently assist this area to move straight upward while you use your muscles on both sides to make a  stinky face   Wrinkle the nose hard, trying to expose your upper teeth or gums equally on both sides  Hold for 5 seconds  Relax   Repeat 10-20x; discontinue before 20 if the muscles feel tired     Active Assisted Smile-- think light, loose and lift   Place four fingers or knuckles on an angle from the corner of the mouth up toward the cheek bone  Smile big on both sides, and at the same time, gently assist the smile on the affected side to look like the unaffected side   Hold for 3-5 seconds   Relax   Repeat 10-20x; discontinue before 20 if the muscles feel tired

## 2023-01-06 NOTE — PROGRESS NOTES
PROGRESS NOTE     TREATMENT PERIOD: 9/2/2022-12/1/2022  REFERRING PROVIDER: Dominique Spivey PA-c  NUMBER OF SESSIONS: 4       11/16/22 0915   Signing Clinician's Name / Credentials   Signing clinician's name /credentials Yandy Woodson MS CCC-SLP   Session Number   Session Number 4   Progress/Recertification   Progress note due 12/01/22   Quick Add   Facial Paralysis Facial Paralysis   Subjective Report   Subjective Report Pt reported to be doing well and reported that she has noticed some improvement in her face during this reporting period.   Functional Problems Anterior loss (improving), pocketing (improving), biting lip/cheek, slurred speech, dry eye   Objective Measures   Objective Measures Objective Measure 1;Objective Measure 2   Objective Measure 1   Objective Measure FaCE Instrument   Details 11/100   Objective Measure 2   Objective Measure SAQ   Details 8/100   Resting Symmetry Location    Palpebral Fissure Eye Tone Normal  (0)   Nasolabial Fold Normal  (0)   Lips Normal   Voluntary Movement: Minimal Effort Eye Closure    Voluntary Movement: Minimal Effort Eye Closure 4.0   Minimal Effort Eye Closure Complete No   Minimal Effort Eye Closure Lack in mm <1 mm   General Severity of Synkinesis none   Voluntary Movement: Forehead   Voluntary Movement: Forehead Elevation 2.0   Forehead Elevation  Strength Rating % 10%   Forehead-Synkinesis Zygomaticus;Orbicularis Occuli;Risorius   General Severity of Synkinesis moderate  (2.0)   Voluntary Movement: Open Mouth Smile   Voluntary Movement: Open Mouth Smile 4.0   Open Mouth Smile Strength Rating% 75%   General Severity of Synkinesis mild  (1.0)   Voluntary Movement: Snarl   Voluntary Movement: Snarl 3.0   Snarl Strength Rating % 30%   General Severity of Synkinesis none   Voluntary Movement: Pucker   Voluntary Movement: Pucker 2.0   Pucker Strength Rating % 5%   General Severity of Synkinesis none   Facial Paralysis Goals   Facial Paralysis Goals 1;2;3;4    Facial Paralysis Goal 1   Goal Identifier 1   Goal Description Pt will increase her FGS reflecting gains in resting symmetry, symmetry of movement, and reduced synkinesis (if present) when compared to her status noted at the initial evaluation.   Goal Progress Goal met and extended to gain an additional 10 points.  Pt has increased her FGS to 58/100.   Target Date 12/01/22   Date Met 11/17/22   Facial Paralysis Goal 2   Goal Identifier 2   Goal Description Pt will demonstrate complete eye closure when compared to her status of 4 mm open as measured during the initial evaluation.   Goal Progress Goal ongoing.  Pt is demonstrating eye closure but remains <1 mm open at this time.  Pt reported that she does not like the eyelid stretch as written.  Retrained eyelid stretch with holding lower lid and stretching up through brow.  Pt independent after training.  Pt trained to continue AA to active eye exercises.   Target Date 12/01/22   Facial Paralysis Goal 3   Goal Identifier 3   Goal Description Pt will report a 25% improvement in articulartory precision when compared to her status at the initial evaluation.   Goal Progress Goal ongoing.  Exercises trained below designed to impact speech production and reduce imprecision.  Pt did not formally report on improvement but notes that communication with  remains challenging due to speech deficits and his reduced hearing.   Target Date 12/01/22   Facial Paralysis Goal 4   Goal Identifier 4   Goal Description Pt will increase her FaCE Instrument score by 10 points reflecting gains in physical functioning and acceptance when compared to her score taken during the initial evlaluation.   Goal Progress Goal ongoing.  FaCE Instrument administered with pt scoring 11/100.  This score is reduced from initial score of 26/100.   Target Date 12/01/22   Treatment Interventions    Treatment Interventions Treatment Speech/Lang/Voice;Facial Paralysis-Strengthening   Treatment  Speech/Lang/Voice   Treatment of Speech, Language, Voice Communication&/or Auditory Processing (87674) 48 Minutes   Skilled Intervention Provided written and verbal information on.;Educated patient on risks.;Modeled compensatory strategies;Provided feedback on performance of tasks;Other  (Trained flaccid massage, exercises)   Patient Response Pt reported understanding and agreement   Treatment Detail See above   Progress Good.   Strengthening   Strengthening Eye Closure;Pucker   Eye Closure Active Assisted   Snarl Active Assisted To Active   Pucker Active Assisted   Smile Open Mouth Active Assisted   Snarl Plus Smile Active Assisted   Assessments Completed   Assessments Completed FGS   Facial Grading Score 58/100   Education   Learner Patient   Readiness Eager;Acceptance   Method Booklet/handout;Explanation;Demonstration   Response Verbalizes understanding;Demonstrates understanding   Plan   Homework Exercises as listed above 2-3x/day   Plan for next session Reassess, advance exercises, educate   Total Session Time   Total Treatment Time (sum of timed and untimed services) 48   Medicare Claim Information   Medical Diagnosis Other speech disturbances   Swallowing Diagnosis Mild oral phase dysphagia   Communication Diagnosis Non-verbal communication impairment, mild dysarthria     PROGRESS: Pt is making expected slow progress with regards to facial movement.  She unfortunately is demonstrating increased synkinesis with this recovery and has been trained on massages/stretches for management.  Pt has also consulted with Dr. Lai for possible Botox for management of synkinesis but is unsure if she would like to pursue this.  Regardless, pt continues to demonstrate a need for skilled ST to address facial movement to maximize functions of speech, swallowing, and non-verbal communication.      DISCHARGE: No

## 2023-04-28 ENCOUNTER — THERAPY VISIT (OUTPATIENT)
Dept: SPEECH THERAPY | Facility: CLINIC | Age: 78
End: 2023-04-28
Payer: COMMERCIAL

## 2023-04-28 DIAGNOSIS — R47.1 DYSARTHRIA: Primary | ICD-10-CM

## 2023-04-28 DIAGNOSIS — R13.11 ORAL PHASE DYSPHAGIA: ICD-10-CM

## 2023-04-28 PROCEDURE — 92507 TX SP LANG VOICE COMM INDIV: CPT | Mod: GN | Performed by: SPEECH-LANGUAGE PATHOLOGIST

## 2023-04-28 NOTE — DISCHARGE INSTRUCTIONS
Wheel Massage   Consider your face a wheel and you are massaging the spokes of a wheel, from the outer rim to the center .  In that way, you will massage the entire side of your face in sections.  As you move along, if you find painful points, maintain the pressure on it until the pain diminishes, then continue on with the stretch.  For the best massage technique, you will want to use the hand that is opposite of your facial tightness.  For each section, stretch slowly, for ~8-10 seconds per area.  You will repeat each section three times in each muscle section or go around the half [place] of stretches 3 times (one group at a time).    For the first section, place your thumb in your mouth in front of your upper teeth and two or three fingers on the nose   Pull the tissue down with two or three fingers until you meet your thumb   Press together (from the inside and outside) while you stretch your face toward the center of your lips.    For this section, pull in a slight arc around your nostril and pull all the way through the lip.    The second section t is located under your eye, but do NOT pull the lower lid down  Start below the eye and pull straight towards the center of the lips   The third section is located at the temple where you will pull downward toward the center of the lips   For the fourth section, is located in front of your ear   Pull horizontally to the center of the lips   The final section is located under the jaw but more towards the affected side  Pull upward to the center of the lips   The thumb is inside between the front of the lower teeth and inside the chin    Levator Stretch  Using the pad of your index finger  Find the spot that is tight to the right of your nostril   Hold and press the spot for 60 seconds or when you feel it release    Around the Eye   Using pointer and middle fingers of both hands, start in the center of your eyebrow and pull away   Repeat 3x  Move to the temple, and  using the same fingers, pull up towrads the ceiling and down to the floor   Repeat 3x  Move under the eye in the center, on the ledge of the cheek bone, and using the same fingers, pull away  Repeat 3x    Eyebrow Stretch   Use a finger to press ON the brow at the end closest to the nose  Slowly press and pull outward toward the temple   Pause on any sore spots along the way until the discomfort begins to diminish   Continue outward all the way to the temple   Repeat 1-3x   Use a finger to press ABOVE the brow at the end closest to the nose  Slowly press and pull outward toward the temple   Pause on any sore spots along the way until the discomfort begins to diminish   Continue outward all the way to the temple   Repeat 1-3x   Use a finger to press BELOW the brow at the end closest to the nose  Slowly press and pull outward toward the temple   Pause on any sore spots along the way until the discomfort begins to diminish   Continue outward all the way to the temple   Repeat 1-3x      Around the Eye   Using pointer and middle fingers of both hands, start in the center of your eyebrow and pull away   Repeat 3x  Move to the temple, and using the same fingers, pull up towrads the ceiling and down to the floor   Repeat 3x  Move under the eye in the center, and using the same fingers, pull away  Repeat 3x    Advanced Hook   Place the pointer and index fingers (of the unaffected side) deep inside your cheek   Using your fingers, stretch the muscle outward, pusing it in the direction away from your teeth   Like a hook   Hold the stretch for 60 seconds   Relax the facial muscles   Slowly remove the hand and just let everything hang loose for 5 seconds   Place the pointer and index fingers (of the unaffected side) inside your cheek but closer to the corner of your mouth this time or where it feels most tight   Using your fingers, stretch the muscle outward, pusing it in the direction away from your teeth   Like a hook   Hold  the stretch for 60 seconds   Relax the facial muscles   Slowly remove the hand and just let everything hang loose for 5 seconds

## 2023-05-02 DIAGNOSIS — G24.3 SPASMODIC TORTICOLLIS: ICD-10-CM

## 2023-05-02 DIAGNOSIS — G51.39 HEMIFACIAL SPASM: Primary | ICD-10-CM

## 2023-05-02 DIAGNOSIS — G24.5 BLEPHAROSPASM: ICD-10-CM

## 2023-05-24 ENCOUNTER — THERAPY VISIT (OUTPATIENT)
Dept: SPEECH THERAPY | Facility: CLINIC | Age: 78
End: 2023-05-24
Payer: COMMERCIAL

## 2023-05-24 DIAGNOSIS — R13.11 ORAL PHASE DYSPHAGIA: ICD-10-CM

## 2023-05-24 DIAGNOSIS — R47.1 DYSARTHRIA: Primary | ICD-10-CM

## 2023-05-24 PROCEDURE — 92507 TX SP LANG VOICE COMM INDIV: CPT | Mod: GN | Performed by: SPEECH-LANGUAGE PATHOLOGIST

## 2023-05-24 NOTE — PATIENT INSTRUCTIONS
Controlling Eye Synkinesis when Eating     Massage  Using a slow, circular motion, gently massage your temple area on your affected side as you are eating, drinking, and/or speaking    Neuromuscular Reeducation   If you feel your eye closing when chewing, stop for a moment and gently close both of your eyes.  Hold the eyes closed for 5 seconds and then continue chewing as you gently open your eyes.      Decreasing Eye Tearing While Eating  Eat slowly, take smaller bites, and drink lots of fluids with your meal  Slowly massage your jaw area in a clockwise direction for one to two minutes      Wheel Massage   Consider your face a wheel and you are massaging the spokes of a wheel, from the outer rim to the center .  In that way, you will massage the entire side of your face in sections.  As you move along, if you find painful points, maintain the pressure on it until the pain diminishes, then continue on with the stretch.  For the best massage technique, you will want to use the hand that is opposite of your facial tightness.  For each section, stretch slowly, for ~8-10 seconds per area.  You will repeat each section three times in each muscle section or go around the half [place] of stretches 3 times (one group at a time).    For the first section, place your thumb in your mouth in front of your upper teeth and two or three fingers on the nose   Pull the tissue down with two or three fingers until you meet your thumb   Press together (from the inside and outside) while you stretch your face toward the center of your lips.    For this section, pull in a slight arc around your nostril and pull all the way through the lip.    The second section t is located under your eye, but do NOT pull the lower lid down  Start below the eye and pull straight towards the center of the lips   The third section is located at the temple where you will pull downward toward the center of the lips   For the fourth section, is located in  front of your ear   Pull horizontally to the center of the lips   The final section is located under the jaw but more towards the affected side  Pull upward to the center of the lips   The thumb is inside between the front of the lower teeth and inside the chin    Levator Stretch  Using the pad of your index finger  Find the spot that is tight to the right of your nostril   Hold and press the spot for 60 seconds or when you feel it release     Around the Eye   Using pointer and middle fingers of both hands, start in the center of your eyebrow and pull away   Repeat 3x  Move to the temple, and using the same fingers, pull up towrads the ceiling and down to the floor   Repeat 3x  Move under the eye in the center, on the ledge of the cheek bone, and using the same fingers, pull away  Repeat 3x     Eyebrow Stretch   Use a finger to press ON the brow at the end closest to the nose  Slowly press and pull outward toward the temple   Pause on any sore spots along the way until the discomfort begins to diminish   Continue outward all the way to the temple   Repeat 1-3x   Use a finger to press ABOVE the brow at the end closest to the nose  Slowly press and pull outward toward the temple   Pause on any sore spots along the way until the discomfort begins to diminish   Continue outward all the way to the temple   Repeat 1-3x   Use a finger to press BELOW the brow at the end closest to the nose  Slowly press and pull outward toward the temple   Pause on any sore spots along the way until the discomfort begins to diminish   Continue outward all the way to the temple   Repeat 1-3x      Around the Eye   Using pointer and middle fingers of both hands, start in the center of your eyebrow and pull away   Repeat 3x  Move to the temple, and using the same fingers, pull up towrads the ceiling and down to the floor   Repeat 3x  Move under the eye in the center, and using the same fingers, pull away  Repeat 3x     Advanced Hook   Place the  pointer and index fingers (of the unaffected side) deep inside your cheek   Using your fingers, stretch the muscle outward, pusing it in the direction away from your teeth   Like a hook   Hold the stretch for 60 seconds   Relax the facial muscles   Slowly remove the hand and just let everything hang loose for 5 seconds   Place the pointer and index fingers (of the unaffected side) inside your cheek but closer to the corner of your mouth this time or where it feels most tight   Using your fingers, stretch the muscle outward, pusing it in the direction away from your teeth   Like a hook   Hold the stretch for 60 seconds   Relax the facial muscles   Slowly remove the hand and just let everything hang loose for 5 seconds

## 2023-06-27 ENCOUNTER — THERAPY VISIT (OUTPATIENT)
Dept: SPEECH THERAPY | Facility: CLINIC | Age: 78
End: 2023-06-27
Payer: COMMERCIAL

## 2023-06-27 ENCOUNTER — TELEPHONE (OUTPATIENT)
Dept: PLASTIC SURGERY | Facility: CLINIC | Age: 78
End: 2023-06-27

## 2023-06-27 DIAGNOSIS — R13.11 ORAL PHASE DYSPHAGIA: ICD-10-CM

## 2023-06-27 DIAGNOSIS — R47.1 DYSARTHRIA: Primary | ICD-10-CM

## 2023-06-27 PROCEDURE — 92507 TX SP LANG VOICE COMM INDIV: CPT | Mod: GN | Performed by: SPEECH-LANGUAGE PATHOLOGIST

## 2023-06-27 NOTE — PATIENT INSTRUCTIONS
Wheel Massage   Consider your face a wheel and you are massaging the spokes of a wheel, from the outer rim to the center .  In that way, you will massage the entire side of your face in sections.  As you move along, if you find painful points, maintain the pressure on it until the pain diminishes, then continue on with the stretch.  For the best massage technique, you will want to use the hand that is opposite of your facial tightness.  For each section, stretch slowly, for ~8-10 seconds per area.  You will repeat each section three times in each muscle section or go around the half [place] of stretches 3 times (one group at a time).    For the first section, place your thumb in your mouth in front of your upper teeth and two or three fingers on the nose   Pull the tissue down with two or three fingers until you meet your thumb   Press together (from the inside and outside) while you stretch your face toward the center of your lips.    For this section, pull in a slight arc around your nostril and pull all the way through the lip.    The second section t is located under your eye, but do NOT pull the lower lid down  Start below the eye and pull straight towards the center of the lips   The third section is located at the temple where you will pull downward toward the center of the lips   For the fourth section, is located in front of your ear   Pull horizontally to the center of the lips   The final section is located under the jaw but more towards the affected side  Pull upward to the center of the lips   The thumb is inside between the front of the lower teeth and inside the chin     Levator Stretch  Using the pad of your index finger  Find the spot that is tight to the right of your nostril   Hold and press the spot for 60 seconds or when you feel it release     Eyebrow Stretch   Use a finger to press ON the brow at the end closest to the nose  Slowly press and pull outward toward the temple   Pause on any sore  spots along the way until the discomfort begins to diminish   Continue outward all the way to the temple   Repeat 1-3x   Use a finger to press ABOVE the brow at the end closest to the nose  Slowly press and pull outward toward the temple   Pause on any sore spots along the way until the discomfort begins to diminish   Continue outward all the way to the temple   Repeat 1-3x   Use a finger to press BELOW the brow at the end closest to the nose  Slowly press and pull outward toward the temple   Pause on any sore spots along the way until the discomfort begins to diminish   Continue outward all the way to the temple   Repeat 1-3x      Around the Eye   Using pointer and middle fingers of both hands, start in the center of your eyebrow and pull away   Repeat 3x  Move to the temple, and using the same fingers, pull up towrads the ceiling and down to the floor   Repeat 3x  Move under the eye in the center, and using the same fingers, pull away  Repeat 3x     Advanced Hook   Place the pointer and index fingers (of the unaffected side) deep inside your cheek   Using your fingers, stretch the muscle outward, pusing it in the direction away from your teeth   Like a hook   Hold the stretch for 60 seconds   Relax the facial muscles   Slowly remove the hand and just let everything hang loose for 5 seconds   Place the pointer and index fingers (of the unaffected side) inside your cheek but closer to the corner of your mouth this time or where it feels most tight   Using your fingers, stretch the muscle outward, pusing it in the direction away from your teeth   Like a hook   Hold the stretch for 60 seconds   Relax the facial muscles   Slowly remove the hand and just let everything hang loose for 5 seconds

## 2023-06-29 DIAGNOSIS — G24.3 SPASMODIC TORTICOLLIS: ICD-10-CM

## 2023-06-29 DIAGNOSIS — G51.39 HEMIFACIAL SPASM: ICD-10-CM

## 2023-06-29 DIAGNOSIS — G24.5 BLEPHAROSPASM: ICD-10-CM

## 2023-07-05 ENCOUNTER — OFFICE VISIT (OUTPATIENT)
Dept: OTOLARYNGOLOGY | Facility: CLINIC | Age: 78
End: 2023-07-05
Payer: COMMERCIAL

## 2023-07-05 DIAGNOSIS — G51.31 HEMIFACIAL SPASM OF RIGHT SIDE OF FACE: ICD-10-CM

## 2023-07-05 DIAGNOSIS — G24.3 SPASMODIC TORTICOLLIS: Primary | ICD-10-CM

## 2023-07-05 DIAGNOSIS — G24.5 BLEPHAROSPASM OF RIGHT EYE: ICD-10-CM

## 2023-07-05 DIAGNOSIS — G51.9 SEVENTH CRANIAL NERVE DISEASE OR SYNDROME: ICD-10-CM

## 2023-07-05 PROCEDURE — 99212 OFFICE O/P EST SF 10 MIN: CPT | Mod: 25 | Performed by: OTOLARYNGOLOGY

## 2023-07-05 PROCEDURE — 64612 DESTROY NERVE FACE MUSCLE: CPT | Mod: 50 | Performed by: OTOLARYNGOLOGY

## 2023-07-05 PROCEDURE — 64616 CHEMODENERV MUSC NECK DYSTON: CPT | Mod: RT | Performed by: OTOLARYNGOLOGY

## 2023-07-05 NOTE — NURSING NOTE
Updated photodocumentation obtained.      Consent obtained for Botox.    Pt to f/u in 2 wks for updated photodocumentation s/p Botox injections.    Joslyn Warner RN  7/5/2023 11:38 AM

## 2023-07-05 NOTE — LETTER
7/5/2023       RE: Alisson Roberts  [address] [place]  [facility] [address]     Dear Colleague,    Thank you for referring your patient, Alisson Roberts, to the [facility] [place] at [facility]. Please see a copy of my visit note below.    Facial Plastic and Reconstructive Surgery      Alisson Roberts presents today for evaluation of facial spasm. The patient has had the morbidity of facial nerve dysfunction and has significant morbidity, tightness and discomfort. Involuntary movement is also present around the eye and mouth which leads to discomfort, tightness and fatigue. We have discussed the hemifacial spasm and discomfort and chemodenervation as therapy. Current symptoms are a sequelae of the facial nerve injury. There are  involuntary and unwanted movements of the face that hinder good and functional intended movements.     PMH, PSH, meds and allergies are unchanged.    On exam there is a narrow right palpebral fissure. The eye closes involuntarily with speech. There is facial tightness and neck muscle spasms with smiling. Facial muscle excursion is limited due to the tightness. Oral commissure excursion on the right is approx 80% of normal. The contralateral side has compensatory hypercontracture and hyperfunction.     Assessment:  Clonic Hemifacial spasm  Spastic torticollis  Incomplete facial paralysis  Blepharospasm    Plan:   Chemodenervation of eye, face and neck today, bilateral  Continues to have benefits from treatment  We adapted treatment today based on symptoms and exam.      Procedure: Chemodenervation with Botulinum Toxin A  Indication: Hemifacial Spasm and Hypercontracture  Injector: Alayna Lai MD      Informed consent was obtained.  The skin was cleaned with antimicrobial solution and a topical ice was placed.     The patient was asked to systematically engage the muscles in the area to be injected.  The tuberculin needles were used for subdermal injection and hemostasis was obtained with light digital pressure when needed. The skin was cleaned.     A total of 65 units were injected.  Botulinum toxin A type: Botox    Please see procedure log.    The patient tolerated the procedure well and there were no complications. Post procedure care instructions were given to the patient.      I spent a total of 10 minutes face-to-face with Alisson Roberts during today's office visit.  Over 50% of this time was spent counseling the patient and/or coordinating care regarding the sequelae of facial paralysis and the morbidity associated with facial nerve dysfunction. The time includes reviewing past injections records, discussing effects and adapting to needs for treatment. The injection time is not included in this time and was a separate 7 minutes.  See note for details.          Again, thank you for allowing me to participate in the care of your patient.      Sincerely,    Alayna Lai MD

## 2023-07-05 NOTE — PROGRESS NOTES
Facial Plastic and Reconstructive Surgery      Alisson Roberts presents today for evaluation of facial spasm. The patient has had the morbidity of facial nerve dysfunction and has significant morbidity, tightness and discomfort. Involuntary movement is also present around the eye and mouth which leads to discomfort, tightness and fatigue. We have discussed the hemifacial spasm and discomfort and chemodenervation as therapy. Current symptoms are a sequelae of the facial nerve injury. There are  involuntary and unwanted movements of the face that hinder good and functional intended movements.     PMH, PSH, meds and allergies are unchanged.    On exam there is a narrow right palpebral fissure. The eye closes involuntarily with speech. There is facial tightness and neck muscle spasms with smiling. Facial muscle excursion is limited due to the tightness. Oral commissure excursion on the right is approx 80% of normal. The contralateral side has compensatory hypercontracture and hyperfunction.     Assessment:  Clonic Hemifacial spasm  Spastic torticollis  Incomplete facial paralysis  Blepharospasm    Plan:   Chemodenervation of eye, face and neck today, bilateral  Continues to have benefits from treatment  We adapted treatment today based on symptoms and exam.      Procedure: Chemodenervation with Botulinum Toxin A  Indication: Hemifacial Spasm and Hypercontracture  Injector: Alayna Lai MD      Informed consent was obtained.  The skin was cleaned with antimicrobial solution and a topical ice was placed.     The patient was asked to systematically engage the muscles in the area to be injected. The tuberculin needles were used for subdermal injection and hemostasis was obtained with light digital pressure when needed. The skin was cleaned.     A total of 65 units were injected.  Botulinum toxin A type: Botox    Please see procedure log.    The patient tolerated the procedure well and there were no complications. Post  procedure care instructions were given to the patient.      I spent a total of 10 minutes face-to-face with Alisson Roberts during today's office visit.  Over 50% of this time was spent counseling the patient and/or coordinating care regarding the sequelae of facial paralysis and the morbidity associated with facial nerve dysfunction. The time includes reviewing past injections records, discussing effects and adapting to needs for treatment. The injection time is not included in this time and was a separate 7 minutes.  See note for details.

## 2023-07-19 ENCOUNTER — ALLIED HEALTH/NURSE VISIT (OUTPATIENT)
Dept: OTOLARYNGOLOGY | Facility: CLINIC | Age: 78
End: 2023-07-19
Payer: COMMERCIAL

## 2023-07-19 DIAGNOSIS — G51.31 HEMIFACIAL SPASM OF RIGHT SIDE OF FACE: Primary | ICD-10-CM

## 2023-07-19 PROCEDURE — 99207 PR NO CHARGE NURSE ONLY: CPT

## 2023-07-19 NOTE — PROGRESS NOTES
Pt comes in for updated photodocumentation s/p Botox injections with Dr. Lai.    Pt has had notable improvement in synkinesis on the right side of her face.     Pt's right eye is much less synkinetic and she is pleased with the improvement, stating she can see better bc her eye isn't watering as much as it did pre Botox.    Right platysma bands have greatly improved, although her anterior platysma band on the right is still quite synkinetic and could use more Botox next time.    Pt is pleased with improvement in tightness throughout the right side of her face and says it feels much better.    Pt to follow up with Dr. Lai in 2.5 months for repeat Botox treatment.    Joslyn Warner RN  7/19/2023 12:26 PM

## 2023-07-28 ENCOUNTER — THERAPY VISIT (OUTPATIENT)
Dept: SPEECH THERAPY | Facility: CLINIC | Age: 78
End: 2023-07-28
Payer: COMMERCIAL

## 2023-07-28 DIAGNOSIS — R47.1 DYSARTHRIA: Primary | ICD-10-CM

## 2023-07-28 DIAGNOSIS — R13.11 ORAL PHASE DYSPHAGIA: ICD-10-CM

## 2023-07-28 PROCEDURE — 92507 TX SP LANG VOICE COMM INDIV: CPT | Mod: GN | Performed by: SPEECH-LANGUAGE PATHOLOGIST

## 2023-07-28 NOTE — PATIENT INSTRUCTIONS
Wheel Massage (not behind the teeth)  Consider your face a wheel and you are massaging the spokes of a wheel, from the outer rim to the center .  In that way, you will massage the entire side of your face in sections.  As you move along, if you find painful points, maintain the pressure on it until the pain diminishes, then continue on with the stretch.  For the best massage technique, you will want to use the hand that is opposite of your facial tightness.  For each section, stretch slowly, for ~8-10 seconds per area.  You will repeat each section three times in each muscle section or go around the half [place] of stretches 3 times (one group at a time).    For the first section, place your thumb in your mouth in front of your upper teeth and two or three fingers on the nose   Pull the tissue down with two or three fingers until you meet your thumb   Press together (from the inside and outside) while you stretch your face toward the center of your lips.    For this section, pull in a slight arc around your nostril and pull all the way through the lip.    The second section t is located under your eye, but do NOT pull the lower lid down  Start below the eye and pull straight towards the center of the lips   The third section is located at the temple where you will pull downward toward the center of the lips   For the fourth section, is located in front of your ear   Pull horizontally to the center of the lips   The final section is located under the jaw but more towards the affected side  Pull upward to the center of the lips   The thumb is inside between the front of the lower teeth and inside the chin     Levator Stretch  Using the pad of your index finger  Find the spot that is tight to the right of your nostril   Hold and press the spot for 60 seconds or when you feel it release     Eyebrow Stretch   Use a finger to press ON the brow at the end closest to the nose  Slowly press and pull outward toward the  temple   Pause on any sore spots along the way until the discomfort begins to diminish   Continue outward all the way to the temple   Repeat 1-3x   Use a finger to press ABOVE the brow at the end closest to the nose  Slowly press and pull outward toward the temple   Pause on any sore spots along the way until the discomfort begins to diminish   Continue outward all the way to the temple   Repeat 1-3x   Use a finger to press BELOW the brow at the end closest to the nose  Slowly press and pull outward toward the temple   Pause on any sore spots along the way until the discomfort begins to diminish   Continue outward all the way to the temple   Repeat 1-3x      Around the Eye   Using pointer and middle fingers of both hands, start in the center of your eyebrow and pull away   Repeat 3x  Move to the temple, and using the same fingers, pull up towrads the ceiling and down to the floor   Repeat 3x  Move under the eye in the center, and using the same fingers, pull away  Repeat 3x     Advanced Hook   Place the pointer and index fingers (of the unaffected side) deep inside your cheek   Using your fingers, stretch the muscle outward, pusing it in the direction away from your teeth   Like a hook   Hold the stretch for 60 seconds   Relax the facial muscles   Slowly remove the hand and just let everything hang loose for 5 seconds   Place the pointer and index fingers (of the unaffected side) inside your cheek but closer to the corner of your mouth this time or where it feels most tight   Using your fingers, stretch the muscle outward, pusing it in the direction away from your teeth   Like a hook   Hold the stretch for 60 seconds   Relax the facial muscles   Slowly remove the hand and just let everything hang loose for 5 seconds      Exercises to Improve Smile     Active Assisted Smile   Place four fingers or knuckles on an angle from the corner of the mouth up toward the cheek bone  Smile big on both sides, and at the same  time, gently assist the smile on the affected side to look like the unaffected side   Hold for 3-5 seconds   Relax   Repeat 10-20x; discontinue before 20 if the muscles feel tired

## 2023-09-06 ENCOUNTER — THERAPY VISIT (OUTPATIENT)
Dept: SPEECH THERAPY | Facility: CLINIC | Age: 78
End: 2023-09-06
Payer: COMMERCIAL

## 2023-09-06 DIAGNOSIS — R13.11 ORAL PHASE DYSPHAGIA: ICD-10-CM

## 2023-09-06 DIAGNOSIS — R47.1 DYSARTHRIA: Primary | ICD-10-CM

## 2023-09-06 PROCEDURE — 92507 TX SP LANG VOICE COMM INDIV: CPT | Mod: GN | Performed by: SPEECH-LANGUAGE PATHOLOGIST

## 2023-09-06 NOTE — PROGRESS NOTES
[facility]                                                                                   OUTPATIENT SPEECH LANGUAGE PATHOLOGY    PLAN OF TREATMENT FOR OUTPATIENT REHABILITATION   Patient's Last Name, First Name, Alisson Jarrett YOB: 1945   Provider's Name   Psychiatric   Medical Record No.  [id number]     Onset Date:  9/2/22 Start of Care Date: 09/02/22     Medical Diagnosis:  Bell's Palsy      SLP Treatment Diagnosis: Non-verbal communication impairment, mild dysarthria, mild oral stage dysphagia  Plan of Treatment  Frequency/Duration: 1x/month  / 12 months     Certification date from 05/20/23   To 08/23/23          See note for plan of treatment details and functional goals     Therese Mtz, SLP                         I CERTIFY THE NEED FOR THESE SERVICES FURNISHED UNDER        THIS PLAN OF TREATMENT AND WHILE UNDER MY CARE     (Physician attestation of this document indicates review and certification of the therapy plan).                Referring Provider:  Dr. Alayna Lara      Initial Assessment  See Epic Evaluation- 09/02/22      PLAN  Continue therapy per current plan of care.    Beginning/End Dates of Progress Note Reporting Period:  09/06/23  to 09/06/2023    Referring Provider:  Referred Self     09/06/23 1000   Appointment Info   Treating Provider Therese Mtz MS, CCC-SLP   Medical Diagnosis Bell's Palsy   SLP Tx Diagnosis Non-verbal communication impairment, mild dysarthria, mild oral stage dysphagia   Session Number   Session Number 6   Subjective Report   Functional Problems Anterior loss (improving), pocketing (improving), biting lip/cheek, slurred speech, dry eye   Objective Measures   Objective Measures Objective Measure 1;Objective Measure 2   Objective Measure 1   Objective Measure FaCE Instrument 58/100   Objective Measure 2   Objective Measure SAQ 33/100   Resting Symmetry Location     Palpebral Fissure Eye Tone Narrow  (1.0)   Nasolabial Fold More Pronounced  (1.0)   Lips Normal  (Retracted slightly)   Voluntary Movement: Minimal Effort Eye Closure    Minimal Effort Eye Closure Complete No   Minimal Effort Eye Closure Lack in mm 0 mm   Minimal Effort Eye Closure-Synkinesis ;Zygomaticus;Risorius;Platysma;Levators;Mentalis;Other  (Buccinator)   General Severity of Synkinesis severe  (3.0)   Voluntary Movement: Forehead   Voluntary Movement: Forehead Elevation 3   Forehead Elevation  Strength Rating % 40%   Forehead-Synkinesis Zygomaticus;Orbicularis Occuli;Risorius;;Platysma; Levators;Mentalis;Other  (Buccinator)   General Severity of Synkinesis severe   (3.0)   Voluntary Movement: Open Mouth Smile   Voluntary Movement: Open Mouth Smile 3.0   Open Mouth Smile Strength Rating% 75%   Open Mouth Smile-Synkinesis Orbicularis Occuli;;Platysma;Mentalis; Levators;Zygomaticus;Other;Frontalis;Risorius  (Buccinator)   General Severity of Synkinesis severe   (3.0)   Voluntary Movement: Snarl   Voluntary Movement: Snarl 3.0   Snarl Strength Rating % 40%   Snarl-Synkinesis ;Orbicularis Occuli;Zygomaticus;Mentalis;Levators;Other;Platysma;Risorius  (Buccinator)   General Severity of Synkinesis severe   (3.0)   Voluntary Movement: Pucker   Voluntary Movement: Pucker 4.0   Pucker Strength Rating % 65%   Pucker-Synkinesis Orbicularis Occuli;;Zygomaticus;Risorius;Platysma;Other  (Buccinator)   General Severity of Synkinesis severe  (3.0)   Treatment Interventions (SLP)   Treatment Interventions Treatment Speech/Lang/Voice;Facial Paralysis-Strengthening   Treatment Speech/Lang/Voice   Treatment of Speech, Language, Voice Communication&/or Auditory Processing (34721) 45 Minutes   Skilled Intervention Provided written and verbal information on.;Educated patient on risks.;Modeled compensatory strategies;Provided feedback on performance of tasks;Other  (Trained flaccid  massage, exercises)   Patient Response/Progress Pt reported understanding and agreement   Treatment Detail See above   Progress Good.   Strengthening   Strengthening Eye Closure;Pucker   Education   Learner/Method Patient   Plan   Plan for next session SLP: Advance stretches, re-assess   Total Session Time   Total Treatment Time (sum of timed and untimed services) 45   Facial Paralysis Goal 1   Goal Identifier 1   Goal Description Pt will increase her FGS reflecting gains in resting symmetry, symmetry of movement, and reduced synkinesis (if present) when compared to her status noted at the initial evaluation.   Target Date 08/23/23   Goal Progress Goal ongoing. Reassess at next session   Facial Paralysis Goal 2   Goal Identifier 2   Goal Description Pt will demonstrate complete eye closure when compared to her status of 4 mm open as measured during the initial evaluation.   Goal Progress Goal met previously.   Target Date 02/28/23   Date Met 01/06/23   Facial Paralysis Goal 3   Goal Identifier 3   Goal Description Pt will report a 25% improvement in articulartory precision when compared to her status at the initial evaluation.   Target Date 08/23/23   Goal Progress Alisson notes improvement in her articulation but still feels tension and tightness when speaking. Sometimes she feels trouble with some sounds when the right side of her mouth is very tight. Encouraged her to stretch before gatherings that require a lot of talking. She continues to lick her lips during conversation throughout the session.   Facial Paralysis Goal 4   Goal Identifier 4   Goal Description Pt will increase her FaCE Instrument score by 10 points reflecting gains in physical functioning and acceptance when compared to her score taken during the initial evlaluation.   Target Date 08/23/23   Goal Progress Goal ongoing. FaCE assessment to be completed at next session   Facial Paralysis Goal  5   Goal Identifier 5   Goal Description Pt will report a  25% reduction in tension and tightness to the R face when compared to her status noted on 1/6/2023.   Target Date 08/23/23   Goal Progress Pt reports slight reduction but not 25%. She feels some increased tension in the forhead. She started doing massage to help improve her tension and tightness. Encouraged her to complete gentle massage throughout the face prior to stretching sessions. She may benefit from training on forehead stretch next session if pain in the frontalis continues. She continues to report tension throughout the right side of the face however at rest, she does appear more symmetrical.   OTHER   Facial Paralysis Goals 1;2;3;4;5   Clinical Impressions   Swallowing Diagnosis Mild oral phase dysphagia   Communication Diagnosis Non-verbal communication impairment, mild dysarthria   General Patient Information   Medical Diagnosis Other speech disturbances

## 2023-09-06 NOTE — PATIENT INSTRUCTIONS
Wheel Massage (not behind the teeth)  Consider your face a wheel and you are massaging the spokes of a wheel, from the outer rim to the center .  In that way, you will massage the entire side of your face in sections.  As you move along, if you find painful points, maintain the pressure on it until the pain diminishes, then continue on with the stretch.  For the best massage technique, you will want to use the hand that is opposite of your facial tightness.  For each section, stretch slowly, for ~8-10 seconds per area.  You will repeat each section three times in each muscle section or go around the half [place] of stretches 3 times (one group at a time).    For the first section, place your thumb in your mouth in front of your upper teeth and two or three fingers on the nose   Pull the tissue down with two or three fingers until you meet your thumb   Press together (from the inside and outside) while you stretch your face toward the center of your lips.    For this section, pull in a slight arc around your nostril and pull all the way through the lip.    The second section t is located under your eye, but do NOT pull the lower lid down  Start below the eye and pull straight towards the center of the lips   The third section is located at the temple where you will pull downward toward the center of the lips   For the fourth section, is located in front of your ear   Pull horizontally to the center of the lips   The final section is located under the jaw but more towards the affected side  Pull upward to the center of the lips   The thumb is inside between the front of the lower teeth and inside the chin     Levator Stretch  Using the pad of your index finger  Find the spot that is tight to the right of your nostril   Hold and press the spot for 60 seconds or when you feel it release     Eyebrow Stretch   Use a finger to press ON the brow at the end closest to the nose  Slowly press and pull outward toward the temple    Pause on any sore spots along the way until the discomfort begins to diminish   Continue outward all the way to the temple   Repeat 1-3x   Use a finger to press ABOVE the brow at the end closest to the nose  Slowly press and pull outward toward the temple   Pause on any sore spots along the way until the discomfort begins to diminish   Continue outward all the way to the temple   Repeat 1-3x   Use a finger to press BELOW the brow at the end closest to the nose  Slowly press and pull outward toward the temple   Pause on any sore spots along the way until the discomfort begins to diminish   Continue outward all the way to the temple   Repeat 1-3x      Around the Eye   Using pointer and middle fingers of both hands, start in the center of your eyebrow and pull away   Repeat 3x  Move to the temple, and using the same fingers, pull up towrads the ceiling and down to the floor   Repeat 3x  Move under the eye in the center, and using the same fingers, pull away  Repeat 3x     Advanced Hook   Place the pointer and index fingers (of the unaffected side) deep inside your cheek   Using your fingers, stretch the muscle outward, pusing it in the direction away from your teeth   Like a hook   Hold the stretch for 60 seconds   Relax the facial muscles   Slowly remove the hand and just let everything hang loose for 5 seconds   Place the pointer and index fingers (of the unaffected side) inside your cheek but closer to the corner of your mouth this time or where it feels most tight   Using your fingers, stretch the muscle outward, pusing it in the direction away from your teeth   Like a hook   Hold the stretch for 60 seconds   Relax the facial muscles   Slowly remove the hand and just let everything hang loose for 5 seconds      Exercises to Improve Smile      Active Assisted Smile   Place four fingers or knuckles on an angle from the corner of the mouth up toward the cheek bone  Smile big on both sides, and at the same time,  gently assist the smile on the affected side to look like the unaffected side   Hold for 3-5 seconds   Relax   Repeat 10-20x; discontinue before 20 if the muscles feel tired

## 2023-10-11 ENCOUNTER — OFFICE VISIT (OUTPATIENT)
Dept: OTOLARYNGOLOGY | Facility: CLINIC | Age: 78
End: 2023-10-11
Payer: COMMERCIAL

## 2023-10-11 VITALS — SYSTOLIC BLOOD PRESSURE: 128 MMHG | BODY MASS INDEX: 21.26 KG/M2 | DIASTOLIC BLOOD PRESSURE: 65 MMHG | WEIGHT: 120 LBS

## 2023-10-11 DIAGNOSIS — G24.3 SPASMODIC TORTICOLLIS: ICD-10-CM

## 2023-10-11 DIAGNOSIS — G51.31 HEMIFACIAL SPASM OF RIGHT SIDE OF FACE: ICD-10-CM

## 2023-10-11 DIAGNOSIS — G51.9 SEVENTH CRANIAL NERVE DISEASE OR SYNDROME: Primary | ICD-10-CM

## 2023-10-11 DIAGNOSIS — G24.5 BLEPHAROSPASM OF RIGHT EYE: ICD-10-CM

## 2023-10-11 PROCEDURE — 99212 OFFICE O/P EST SF 10 MIN: CPT | Mod: 25 | Performed by: OTOLARYNGOLOGY

## 2023-10-11 PROCEDURE — 64616 CHEMODENERV MUSC NECK DYSTON: CPT | Mod: RT | Performed by: OTOLARYNGOLOGY

## 2023-10-11 PROCEDURE — 64612 DESTROY NERVE FACE MUSCLE: CPT | Mod: 50 | Performed by: OTOLARYNGOLOGY

## 2023-10-11 ASSESSMENT — PAIN SCALES - GENERAL: PAINLEVEL: NO PAIN (0)

## 2023-10-11 NOTE — LETTER
10/11/2023       RE: Alisson Roberts  [address] [place]  [facility] [address]     Dear Colleague,    Thank you for referring your patient, Alisson Roberts, to the [facility] E[facility]SE [facility]AT CLINIC [place] at [facility]. Please see a copy of my visit note below.    Facial Plastic and Reconstructive Surgery      Alisson Roberts presents today for evaluation of facial spasm. The patient has had the morbidity of facial nerve dysfunction and has significant morbidity, tightness and discomfort. Involuntary movement is also present around the eye and mouth which leads to discomfort, tightness and fatigue. We have discussed the hemifacial spasm and discomfort and chemodenervation as therapy. Current symptoms are a sequelae of the facial nerve injury. There are  involuntary and unwanted movements of the face that hinder good and functional intended movements.     PMH, PSH, meds and allergies are unchanged.    On exam there is a narrow right palpebral fissure. The eye closes involuntarily with speech. There is facial tightness and neck muscle spasms with smiling. Facial muscle excursion is limited due to the tightness. Oral commissure excursion on the right is approx 80% of normal. The contralateral side has compensatory hypercontracture and hyperfunction.     Assessment:  Clonic Hemifacial spasm  Spastic torticollis  Incomplete facial paralysis  Blepharospasm    Plan:   Chemodenervation of eye, face and neck today, bilateral  Continues to have benefits from treatment  We adapted treatment today based on symptoms and exam.      Procedure: Chemodenervation with Botulinum Toxin A  Indication: Hemifacial Spasm and Hypercontracture  Injector: Alayna Lai MD      Informed consent was obtained.  The skin was cleaned with antimicrobial solution and a topical ice was placed.     The patient was asked to systematically engage the muscles in the area to be  injected. The tuberculin needles were used for subdermal injection and hemostasis was obtained with light digital pressure when needed. The skin was cleaned.     A total of 65 units were injected.  Botulinum toxin A type: Botox    Please see procedure log.    The patient tolerated the procedure well and there were no complications. Post procedure care instructions were given to the patient.      I spent a total of 10 minutes face-to-face with Alisson Roberts during today's office visit.  Over 50% of this time was spent counseling the patient and/or coordinating care regarding the sequelae of facial paralysis and the morbidity associated with facial nerve dysfunction. The time includes reviewing past injections records, discussing effects and adapting to needs for treatment. The injection time is not included in this time and was a separate 7 minutes.  See note for details.      Again, thank you for allowing me to participate in the care of your patient.      Sincerely,    Alayna Lai MD

## 2023-10-25 ENCOUNTER — THERAPY VISIT (OUTPATIENT)
Dept: SPEECH THERAPY | Facility: CLINIC | Age: 78
End: 2023-10-25
Payer: COMMERCIAL

## 2023-10-25 DIAGNOSIS — R47.1 DYSARTHRIA: Primary | ICD-10-CM

## 2023-10-25 DIAGNOSIS — R13.11 ORAL PHASE DYSPHAGIA: ICD-10-CM

## 2023-10-25 PROCEDURE — 92507 TX SP LANG VOICE COMM INDIV: CPT | Mod: GN | Performed by: SPEECH-LANGUAGE PATHOLOGIST

## 2023-10-25 NOTE — PATIENT INSTRUCTIONS
Wheel Massage (not behind the teeth)  Consider your face a wheel and you are massaging the spokes of a wheel, from the outer rim to the center .  In that way, you will massage the entire side of your face in sections.  As you move along, if you find painful points, maintain the pressure on it until the pain diminishes, then continue on with the stretch.  For the best massage technique, you will want to use the hand that is opposite of your facial tightness.  For each section, stretch slowly, for ~8-10 seconds per area.  You will repeat each section three times in each muscle section or go around the half [place] of stretches 3 times (one group at a time).    For the first section, place your thumb in your mouth in front of your upper teeth and two or three fingers on the nose   Pull the tissue down with two or three fingers until you meet your thumb   Press together (from the inside and outside) while you stretch your face toward the center of your lips.    For this section, pull in a slight arc around your nostril and pull all the way through the lip.    The second section t is located under your eye, but do NOT pull the lower lid down  Start below the eye and pull straight towards the center of the lips   The third section is located at the temple where you will pull downward toward the center of the lips   For the fourth section, is located in front of your ear   Pull horizontally to the center of the lips   The final section is located under the jaw but more towards the affected side  Pull upward to the center of the lips   The thumb is inside between the front of the lower teeth and inside the chin     Levator Stretch  Using the pad of your index finger  Find the spot that is tight to the right of your nostril   Hold and press the spot for 60 seconds or when you feel it release     Eyebrow Stretch   Use a finger to press ON the brow at the end closest to the nose  Slowly press and pull outward toward the temple    Pause on any sore spots along the way until the discomfort begins to diminish   Continue outward all the way to the temple   Repeat 1-3x   Use a finger to press ABOVE the brow at the end closest to the nose  Slowly press and pull outward toward the temple   Pause on any sore spots along the way until the discomfort begins to diminish   Continue outward all the way to the temple   Repeat 1-3x   Use a finger to press BELOW the brow at the end closest to the nose  Slowly press and pull outward toward the temple   Pause on any sore spots along the way until the discomfort begins to diminish   Continue outward all the way to the temple   Repeat 1-3x      Around the Eye   Using pointer and middle fingers of both hands, start in the center of your eyebrow and pull away   Repeat 3x  Move to the temple, and using the same fingers, pull up towrads the ceiling and down to the floor   Repeat 3x  Move under the eye in the center, and using the same fingers, pull away  Repeat 3x     Advanced Hook   Place the pointer and index fingers (of the unaffected side) deep inside your cheek   Using your fingers, stretch the muscle outward, pusing it in the direction away from your teeth   Like a hook   Hold the stretch for 60 seconds   Relax the facial muscles   Slowly remove the hand and just let everything hang loose for 5 seconds   Place the pointer and index fingers (of the unaffected side) inside your cheek but closer to the corner of your mouth this time or where it feels most tight   Using your fingers, stretch the muscle outward, pusing it in the direction away from your teeth   Like a hook   Hold the stretch for 60 seconds   Relax the facial muscles   Slowly remove the hand and just let everything hang loose for 5 seconds      Exercises to Improve Smile      Active Assisted Smile   Place four fingers or knuckles on an angle from the corner of the mouth up toward the cheek bone  Smile big on both sides, and at the same time,  gently assist the smile on the affected side to look like the unaffected side   Hold for 3-5 seconds   Relax   Repeat 10-20x; discontinue before 20 if the muscles feel tired    Exercises to Improve Lip Function     Active Pucker   Use the muscles to pucker   WIth the lined area between the lips and nose centered with the bridge of the nose   Pretend to suck in through a skinny straw or make a prolonged kissing sound  Hold for 3-5 seconds  Relax   Repeat 10-20x; discontinue before 20 if the muscles feel tired    Active Assisted Lower Lip Drop   Place a finger horizontally below the lower lip on the affected side  Gently assist the lip to move straight down at the same time that you use your muscles to drop downward exposing your lower teeth   Pretend you are flossing your lower teeth   Relax  Repeat 10-20x; discontinue before 20 if the muscles feel tired    Active Assisted  [place]  Face - Fingers on both sides pull down  Place four fingers or knuckles on a slight angle downward from the corner of your mouth   Gently assist the lip to move down and outward on the affected side similar to the unaffected side at the same time that your try to make a frightened facial expression   Think of saying  [place]  on both sides   Hold for 5 seconds   Relax  Repeat 10-20x; discontinue before 20 if the muscles feel tired

## 2023-12-04 ENCOUNTER — TELEPHONE (OUTPATIENT)
Dept: OTOLARYNGOLOGY | Facility: CLINIC | Age: 78
End: 2023-12-04
Payer: COMMERCIAL

## 2023-12-04 NOTE — TELEPHONE ENCOUNTER
The pt would like to change her 12.6.13 appointment with Therese Mtz to mid-January. Please call the pt to reschedule. Thanks.

## 2024-01-17 ENCOUNTER — THERAPY VISIT (OUTPATIENT)
Dept: SPEECH THERAPY | Facility: CLINIC | Age: 79
End: 2024-01-17
Payer: COMMERCIAL

## 2024-01-17 DIAGNOSIS — R47.1 DYSARTHRIA: Primary | ICD-10-CM

## 2024-01-17 DIAGNOSIS — R13.11 ORAL PHASE DYSPHAGIA: ICD-10-CM

## 2024-01-17 PROCEDURE — 92507 TX SP LANG VOICE COMM INDIV: CPT | Mod: GN | Performed by: SPEECH-LANGUAGE PATHOLOGIST

## 2024-01-17 NOTE — PATIENT INSTRUCTIONS
Take a 2 week face holiday - no stretching or massage for 2 weeks after botox!    Wheel Massage   Consider your face a wheel and you are massaging the spokes of a wheel, from the outer rim to the center .  In that way, you will massage the entire side of your face in sections.  As you move along, if you find painful points, maintain the pressure on it until the pain diminishes, then continue on with the stretch.  For the best massage technique, you will want to use the hand that is opposite of your facial tightness.  For each section, stretch slowly, for ~8-10 seconds per area.  You will repeat each section three times in each muscle section or go around the half [place] of stretches 3 times (one group at a time).    For the first section, place your thumb in your mouth in front of your upper teeth and two or three fingers on the nose   Pull the tissue down with two or three fingers until you meet your thumb   Press together (from the inside and outside) while you stretch your face toward the center of your lips.    For this section, pull in a slight arc around your nostril and pull all the way through the lip.    The second section t is located under your eye, but do NOT pull the lower lid down  Start below the eye and pull straight towards the center of the lips   The third section is located at the temple where you will pull downward toward the center of the lips   For the fourth section, is located in front of your ear   Pull horizontally to the center of the lips   The final section is located under the jaw but more towards the affected side  Pull upward to the center of the lips   The thumb is inside between the front of the lower teeth and inside the chin      Stretches, Relaxation, Strategies to Reduce Synkinesis      Go Blah  Active Facial Relaxation  With this strategy, you are going to use your mind to relax your face.    Allow your jaw to drop down as if it is hanging like a hammock between two trees    Allow your back teeth to part slightly   Open your lips slightly as it feels comfortable   Imaging your entire face is heavy and hanging downward   Consider using relaxing visual imagery to help   Relax in this manner for 5-10 seconds intermittently throughout the day      The Hook  Buccinator Massage   Place the thumb (if the unaffected side) deep inside your cheek in a location that you might think is particularly tight   Using your thumb, stretch the muscle outward, pusing it in the direction away from your teeth   Like a hook   Hold the stretch for 10-15 seconds   Relax the facial muscles   Repeat 3-5x  Consider stretching the same spot or a few different sports in the same tight cheek area    Eyebrow Stretch   Use a finger to press ON the brow at the end closest to the nose  Slowly press and pull outward toward the temple   Pause on any sore spots along the way until the discomfort begins to diminish   Continue outward all the way to the temple   Repeat 1-3x   Use a finger to press ABOVE the brow at the end closest to the nose  Slowly press and pull outward toward the temple   Pause on any sore spots along the way until the discomfort begins to diminish   Continue outward all the way to the temple   Repeat 1-3x   Use a finger to press BELOW the brow at the end closest to the nose  Slowly press and pull outward toward the temple   Pause on any sore spots along the way until the discomfort begins to diminish   Continue outward all the way to the temple   Repeat 1-3x     Around the Eye   Using pointer and middle fingers of both hands, start in the center of your eyebrow and pull away   Repeat 3x  Move to the temple, and using the same fingers, pull up towrads the ceiling and down to the floor   Repeat 3x  Move under the eye in the center, and using the same fingers, pull away  Repeat 3x    Forehead Stretch   Place four fingers on the brow on the affected side and stabilize it   Use four fingers on the opposite  hand to pull the forehead muscle upward from just above your fingers slowly toward your scalp   Pause on any sore or tender spots along the way until the discomfort begins to diminish   Continue through the stretch  Anticipate taking ~10 seconds through the stretch without pausing   Repeat 1-3x in each of the 3 overlapping sections of the affected side of the forehead      Turn-Key ZAID Stretch   Find your depressor muscle (this is the muscle that turn the corners of your mouth downward to a frown)  Using your thumb and pointer finger, with your thumb on the inside of your lip and the pointer on the outside, pinch the muscle   Turn your arm and hand like you are turning a key to a locked door-- in doing this you are stretching the muscle upward toward the corner of your lip  Move very slowly through this, pausing on any tight spots in this area   Repeat as needed until you feel this muscle as released    Eye to Cheek Nerve Retraining   Place your pointer fingers on your eyelids with your eyes open  Use your fingers to passively close your eyes   Feel fully relaxed through the cheeks   Slowly move the finger of the unaffected side off that eye  Slowly move the finger of the affected side off that eye   If your cheek triggers (I.e., if you feel tightness in your cheek), return the finger to the eye and try again moving to the spot where the cheek is not triggered   Slowly try to advance off the eye, the cheek must remain relaxed   If the cheek does not relax, return the finger to the eye and draw your attention to your cheek  Make sure the cheek fully relaxes and try again to slide the finger off the eye  Move slowly   Repeat multiple times-- trying to be mindful to allow the cheek to remain fully relaxed     Do this also with smiling slowly and holding just before your eye twitches.

## 2024-01-31 ENCOUNTER — OFFICE VISIT (OUTPATIENT)
Dept: OTOLARYNGOLOGY | Facility: CLINIC | Age: 79
End: 2024-01-31
Payer: COMMERCIAL

## 2024-01-31 VITALS — WEIGHT: 119 LBS | HEIGHT: 63 IN | BODY MASS INDEX: 21.09 KG/M2

## 2024-01-31 DIAGNOSIS — G24.5 BLEPHAROSPASM OF RIGHT EYE: ICD-10-CM

## 2024-01-31 DIAGNOSIS — G51.9 SEVENTH CRANIAL NERVE DISEASE OR SYNDROME: ICD-10-CM

## 2024-01-31 DIAGNOSIS — G51.31 HEMIFACIAL SPASM OF RIGHT SIDE OF FACE: Primary | ICD-10-CM

## 2024-01-31 DIAGNOSIS — G24.3 SPASMODIC TORTICOLLIS: ICD-10-CM

## 2024-01-31 PROCEDURE — 64612 DESTROY NERVE FACE MUSCLE: CPT | Mod: 50 | Performed by: OTOLARYNGOLOGY

## 2024-01-31 PROCEDURE — 64616 CHEMODENERV MUSC NECK DYSTON: CPT | Mod: RT | Performed by: OTOLARYNGOLOGY

## 2024-01-31 PROCEDURE — 99212 OFFICE O/P EST SF 10 MIN: CPT | Mod: 25 | Performed by: OTOLARYNGOLOGY

## 2024-01-31 NOTE — LETTER
1/31/2024       RE: Alisson Roberts  [address]  [facility] [address]     Dear Colleague,    Thank you for referring your patient, Alisson Roberts, to the [facility] EAR NOSE AND THROAT CLINIC [place] at [facility]. Please see a copy of my visit note below.    Facial Plastic and Reconstructive Surgery      Alisson Roberts presents today for evaluation of facial spasm. The patient has had the morbidity of facial nerve dysfunction and has significant morbidity, tightness and discomfort. Involuntary movement is also present around the eye and mouth which leads to discomfort, tightness and fatigue. We have discussed the hemifacial spasm and discomfort and chemodenervation as therapy. Current symptoms are a sequelae of the facial nerve injury. There are  involuntary and unwanted movements of the face that hinder good and functional intended movements.     PMH, PSH, meds and allergies are unchanged.    On exam there is a narrow right palpebral fissure. The eye closes involuntarily with speech. There is facial tightness and neck muscle spasms with smiling. Facial muscle excursion is limited due to the tightness. Oral commissure excursion on the right is approx 80% of normal. The contralateral side has compensatory hypercontracture and hyperfunction.     Assessment:  Clonic Hemifacial spasm  Spastic torticollis  Incomplete facial paralysis  Blepharospasm    Plan:   Chemodenervation of eye, face and neck today, bilateral  Continues to have benefits from treatment  We adapted treatment today based on symptoms and exam.      Procedure: Chemodenervation with Botulinum Toxin A  Indication: Hemifacial Spasm and Hypercontracture  Injector: Alayna Lai MD      Informed consent was obtained.  The skin was cleaned with antimicrobial solution and a topical ice was placed.     The patient was asked to systematically engage the muscles in the area to be injected.  The tuberculin needles were used for subdermal injection and hemostasis was obtained with light digital pressure when needed. The skin was cleaned.     A total of 65 units were injected.  Botulinum toxin A type: Botox    Please see procedure log.    The patient tolerated the procedure well and there were no complications. Post procedure care instructions were given to the patient.      I spent a total of 10 minutes face-to-face with Alisson Roberts during today's office visit.  Over 50% of this time was spent counseling the patient and/or coordinating care regarding the sequelae of facial paralysis and the morbidity associated with facial nerve dysfunction. The time includes reviewing past injections records, discussing effects and adapting to needs for treatment. The injection time is not included in this time and was a separate 7 minutes.  See note for details.    Again, thank you for allowing me to participate in the care of your patient.      Sincerely,    Alayna Lai MD

## 2024-04-24 ENCOUNTER — OFFICE VISIT (OUTPATIENT)
Dept: OTOLARYNGOLOGY | Facility: CLINIC | Age: 79
End: 2024-04-24
Payer: COMMERCIAL

## 2024-04-24 ENCOUNTER — THERAPY VISIT (OUTPATIENT)
Dept: SPEECH THERAPY | Facility: CLINIC | Age: 79
End: 2024-04-24
Payer: COMMERCIAL

## 2024-04-24 VITALS — BODY MASS INDEX: 21.09 KG/M2 | HEIGHT: 63 IN | WEIGHT: 119 LBS

## 2024-04-24 DIAGNOSIS — R47.1 DYSARTHRIA: Primary | ICD-10-CM

## 2024-04-24 DIAGNOSIS — G51.31 HEMIFACIAL SPASM OF RIGHT SIDE OF FACE: Primary | ICD-10-CM

## 2024-04-24 DIAGNOSIS — G24.5 BLEPHAROSPASM OF RIGHT EYE: ICD-10-CM

## 2024-04-24 DIAGNOSIS — R13.11 ORAL PHASE DYSPHAGIA: ICD-10-CM

## 2024-04-24 DIAGNOSIS — G24.3 SPASMODIC TORTICOLLIS: ICD-10-CM

## 2024-04-24 PROCEDURE — 92507 TX SP LANG VOICE COMM INDIV: CPT | Mod: GN | Performed by: SPEECH-LANGUAGE PATHOLOGIST

## 2024-04-24 PROCEDURE — 64612 DESTROY NERVE FACE MUSCLE: CPT | Mod: 50 | Performed by: OTOLARYNGOLOGY

## 2024-04-24 PROCEDURE — 99212 OFFICE O/P EST SF 10 MIN: CPT | Mod: 25 | Performed by: OTOLARYNGOLOGY

## 2024-04-24 PROCEDURE — 64616 CHEMODENERV MUSC NECK DYSTON: CPT | Mod: RT | Performed by: OTOLARYNGOLOGY

## 2024-04-24 NOTE — PROGRESS NOTES
Facial Plastic and Reconstructive Surgery      Alisson Roberts presents today for evaluation of facial spasm. The patient has had the morbidity of facial nerve dysfunction and has significant morbidity, tightness and discomfort. Involuntary movement is also present around the eye and mouth which leads to discomfort, tightness and fatigue. We have discussed the hemifacial spasm and discomfort and chemodenervation as therapy. Current symptoms are a sequelae of the facial nerve injury. There are  involuntary and unwanted movements of the face that hinder good and functional intended movements.     She and I discussed the nature of her spasms and the  timeline of aberrant reinnervation syndrome. She had several questions about when her tightness returns and how she can perceive improvement. We discussed the aspects of spasm and self perception.     PMH, PSH, meds and allergies are unchanged.    On exam there is a narrow right palpebral fissure. The eye closes involuntarily with speech. There is facial tightness and neck muscle spasms with smiling. Facial muscle excursion is limited due to the tightness. Oral commissure excursion on the right is approx 80% of normal. The contralateral side has compensatory hypercontracture and hyperfunction.     Assessment:  Clonic Hemifacial spasm  Spastic torticollis  Incomplete facial paralysis  Blepharospasm    Plan:   Chemodenervation of eye, face and neck today, bilateral  Continues to have benefits from treatment  We adapted treatment today based on symptoms and exam.      Procedure: Chemodenervation with Botulinum Toxin A  Indication: Hemifacial Spasm and Hypercontracture  Injector: Alayna Lai MD      Informed consent was obtained.  The skin was cleaned with antimicrobial solution and a topical ice was placed.     The patient was asked to systematically engage the muscles in the area to be injected. The tuberculin needles were used for subdermal injection and hemostasis  was obtained with light digital pressure when needed. The skin was cleaned.     A total of 70 units were injected.  Botulinum toxin A type: Botox  NDC [id number]  Waste 30 units    Please see procedure log.    The patient tolerated the procedure well and there were no complications. Post procedure care instructions were given to the patient.      I spent a total of 10 minutes face-to-face with Alisson Roberts during today's office visit.  The injection time is not included in this time.  See note for details.

## 2024-04-24 NOTE — PROGRESS NOTES
[facility]                                                                                   OUTPATIENT SPEECH LANGUAGE PATHOLOGY    PLAN OF TREATMENT FOR OUTPATIENT REHABILITATION   Patient's Last Name, First Name, Alisson Jarrett YOB: 1945   Provider's Name   S[facility]INA [facility]   Medical Record No.  [id number]     Onset Date:  9/2/22 Start of Care Date: 09/02/22     Medical Diagnosis:  Bell's Palsy      SLP Treatment Diagnosis: Non-verbal communication impairment, mild dysarthria, mild oral stage dysphagia  Plan of Treatment  Frequency/Duration: 1x/month  / 12 months     Certification date from (P) 04/24/24   To (P) 07/23/24          See note for plan of treatment details and functional goals     Therese Mtz, SLP                         I CERTIFY THE NEED FOR THESE SERVICES FURNISHED UNDER        THIS PLAN OF TREATMENT AND WHILE UNDER MY CARE     (Physician attestation of this document indicates review and certification of the therapy plan).              Referring Provider:  Dr. Alayna Lai    Initial Assessment  See Epic Evaluation- 09/02/22            PLAN  Continue therapy per current plan of care.    Beginning/End Dates of Progress Note Reporting Period:   1/13/24  to 04/24/2024    Referring Provider:  Dr. Alayna Lai         04/24/24 1000   Appointment Info   Treating Provider Therese Mtz MS, CCC-SLP   Medical Diagnosis Bell's Palsy   SLP Tx Diagnosis Non-verbal communication impairment, mild dysarthria, mild oral stage dysphagia   Session Number   Session Number 9   Progress Note/Certification   Start Of Care Date 09/02/22   Therapy Frequency 1x/month   Predicted Duration 12 months   Certification date from 04/24/24   Certification date to 07/23/24   Progress Note Due Date 07/23/24   Subjective Report   Subjective Report Pt seen today for therapy session. She will have botox today after our session.    Functional Problems Anterior loss (improving), pocketing (improving), biting lip/cheek, slurred speech, dry eye   Objective Measures   Objective Measures Objective Measure 1;Objective Measure 2   Objective Measure 1   Objective Measure FaCE Instrument   Objective Measure 2   Objective Measure SAQ   Objective Measure 3   Objective Measure FGS   Details 36   Resting Symmetry Location    Palpebral Fissure Eye Tone Narrow   Nasolabial Fold More Pronounced   Lips Elevated  (retracted moderately)   Voluntary Movement: Minimal Effort Eye Closure    Minimal Effort Eye Closure Complete Yes   Minimal Effort Eye Closure Lack in mm 0mm   General Severity of Synkinesis mild   Voluntary Movement: Forehead   Voluntary Movement: Forehead Elevation 3   Forehead Elevation  Strength Rating % 40%   Forehead-Synkinesis Zygomaticus;Orbicularis Occuli;Risorius;; Levators;Mentalis;Other  (Buccinator)   General Severity of Synkinesis moderate to severe  (3.0)   Voluntary Movement: Open Mouth Smile   Voluntary Movement: Open Mouth Smile 3.0   Open Mouth Smile Strength Rating% 75%   Open Mouth Smile-Synkinesis Orbicularis Occuli;;Platysma;Mentalis; Levators;Zygomaticus;Other;Frontalis;Risorius  (Buccinator)   General Severity of Synkinesis severe   (3.0)   Voluntary Movement: Snarl   Voluntary Movement: Snarl 3.0   Snarl Strength Rating % 40%   Snarl-Synkinesis ;Orbicularis Occuli;Zygomaticus;Mentalis;Levators;Other;Platysma;Risorius  (Buccinator)   General Severity of Synkinesis severe   (3.0)   Voluntary Movement: Pucker   Voluntary Movement: Pucker 4.0   Pucker Strength Rating % 65%   Pucker-Synkinesis Orbicularis Occuli;;Zygomaticus;Risorius;Platysma;Other  (Buccinator)   General Severity of Synkinesis severe  (3.0)   Treatment Interventions (SLP)   Treatment Interventions Treatment Speech/Lang/Voice;Facial Paralysis-Strengthening   Treatment Speech/Lang/Voice   Treatment of Speech, Language,  Voice Communication&/or Auditory Processing (12511) 45 Minutes   Skilled Intervention Provided written and verbal information on.;Educated patient on risks.;Modeled compensatory strategies;Provided feedback on performance of tasks;Other  (Trained flaccid massage, exercises)   Patient Response/Progress Pt reported understanding and agreement   Treatment Detail See above   Progress Good.   Strengthening   Strengthening Eye Closure;Pucker   Eye Closure Active    Eye Closure Comments upper and lower lids   Smile Open Mouth Active;Active Assisted To Active   Pucker Active   Smile Open Mouth Comments working to decreased eye to mouth synkinesis   Pucker Comments   (decreasing eye movement)   Education   Learner/Method Patient;No Barriers to Learning   Plan   Plan for next session SLP: Advance stretches, re-assess   Total Session Time   Total Treatment Time (sum of timed and untimed services) 45   Facial Paralysis Goal 1   Goal Identifier 1   Goal Description Pt will increase her FGS reflecting gains in resting symmetry, symmetry of movement, and reduced synkinesis (if present) when compared to her status noted at the initial evaluation.   Target Date 07/23/24   Goal Progress Goal ongoing. Pt reports increased synkinesis around her eye. She is able to close her eyes gently and keep the rest of her face relaxed. This is progress for her. She reports feeling little progress as the botox has worn off. Encouragement give as she was unable to close her eyes at last session without activation of her mouth. She demonstrates improvement in this area. She will continue to work toward improved movement of her mouth without activation of her eye.   Facial Paralysis Goal 2   Goal Identifier 2   Goal Description Pt will demonstrate complete eye closure when compared to her status of 4 mm open as measured during the initial evaluation.   Goal Progress Goal met previously.   Target Date 02/28/23   Date Met 01/06/23   Facial Paralysis  Goal 3   Goal Identifier 3   Goal Description Pt will report a 25% improvement in articulartory precision when compared to her status at the initial evaluation.   Target Date 07/23/24   Facial Paralysis Goal 4   Goal Identifier 4   Goal Description Pt will increase her FaCE Instrument score by 10 points reflecting gains in physical functioning and acceptance when compared to her score taken during the initial evlaluation.   Target Date 07/23/24   Goal Progress Goal ongoing.   Facial Paralysis Goal  5   Goal Identifier 5   Goal Description Pt will report a 25% reduction in tension and tightness to the R face when compared to her status noted on 1/6/2023.   Target Date 07/23/24   Goal Progress Goal ongoing. Pt demonstrated facial massage to stretch muscles wtih min to mod cues. She reports daily completion of tasks as this helps her face feel ok. She notes increased tension over the past few weeks as the botox has worn off.   Clinical Impressions   Swallowing Diagnosis Mild oral phase dysphagia   Communication Diagnosis Non-verbal communication impairment, mild to moderate dysarthria   General Patient Information   Medical Diagnosis Other speech disturbances

## 2024-04-24 NOTE — LETTER
4/24/2024       RE: Alisson Roberts  [address] [place] [facility] [address]     Dear Colleague,    Thank you for referring your patient, Alisson Roberts, to the [facility] [place] at [facility]. Please see a copy of my visit note below.    Facial Plastic and Reconstructive Surgery      Alisson Roberts presents today for evaluation of facial spasm. The patient has had the morbidity of facial nerve dysfunction and has significant morbidity, tightness and discomfort. Involuntary movement is also present around the eye and mouth which leads to discomfort, tightness and fatigue. We have discussed the hemifacial spasm and discomfort and chemodenervation as therapy. Current symptoms are a sequelae of the facial nerve injury. There are  involuntary and unwanted movements of the face that hinder good and functional intended movements.     She and I discussed the nature of her spasms and the  timeline of aberrant reinnervation syndrome. She had several questions about when her tightness returns and how she can perceive improvement. We discussed the aspects of spasm and self perception.     PMH, PSH, meds and allergies are unchanged.    On exam there is a narrow right palpebral fissure. The eye closes involuntarily with speech. There is facial tightness and neck muscle spasms with smiling. Facial muscle excursion is limited due to the tightness. Oral commissure excursion on the right is approx 80% of normal. The contralateral side has compensatory hypercontracture and hyperfunction.     Assessment:  Clonic Hemifacial spasm  Spastic torticollis  Incomplete facial paralysis  Blepharospasm    Plan:   Chemodenervation of eye, face and neck today, bilateral  Continues to have benefits from treatment  We adapted treatment today based on symptoms and exam.      Procedure: Chemodenervation with Botulinum Toxin A  Indication: Hemifacial Spasm and  Hypercontracture  Injector: Alayna Lai MD      Informed consent was obtained.  The skin was cleaned with antimicrobial solution and a topical ice was placed.     The patient was asked to systematically engage the muscles in the area to be injected. The tuberculin needles were used for subdermal injection and hemostasis was obtained with light digital pressure when needed. The skin was cleaned.     A total of 70 units were injected.  Botulinum toxin A type: Botox  NDC [id number]  Waste 30 units    Please see procedure log.    The patient tolerated the procedure well and there were no complications. Post procedure care instructions were given to the patient.      I spent a total of 10 minutes face-to-face with Alisson Roberts during today's office visit.  The injection time is not included in this time.  See note for details.      Again, thank you for allowing me to participate in the care of your patient.      Sincerely,    Alayna Lai MD

## 2024-04-24 NOTE — PATIENT INSTRUCTIONS
Wheel Massage   Consider your face a wheel and you are massaging the spokes of a wheel, from the outer rim to the center .  In that way, you will massage the entire side of your face in sections.  As you move along, if you find painful points, maintain the pressure on it until the pain diminishes, then continue on with the stretch.  For the best massage technique, you will want to use the hand that is opposite of your facial tightness.  For each section, stretch slowly, for ~8-10 seconds per area.  You will repeat each section three times in each muscle section or go around the half [place] of stretches 3 times (one group at a time).    For the first section, place your thumb in your mouth in front of your upper teeth and two or three fingers on the nose   Pull the tissue down with two or three fingers until you meet your thumb   Press together (from the inside and outside) while you stretch your face toward the center of your lips.    For this section, pull in a slight arc around your nostril and pull all the way through the lip.    The second section t is located under your eye, but do NOT pull the lower lid down  Start below the eye and pull straight towards the center of the lips   The third section is located at the temple where you will pull downward toward the center of the lips   For the fourth section, is located in front of your ear   Pull horizontally to the center of the lips   The final section is located under the jaw but more towards the affected side  Pull upward to the center of the lips   The thumb is inside between the front of the lower teeth and inside the chin        Stretches, Relaxation, Strategies to Reduce Synkinesis       Go Blah  Active Facial Relaxation  With this strategy, you are going to use your mind to relax your face.    Allow your jaw to drop down as if it is hanging like a hammock between two trees   Allow your back teeth to part slightly   Open your lips slightly as it feels  comfortable   Imaging your entire face is heavy and hanging downward   Consider using relaxing visual imagery to help   Relax in this manner for 5-10 seconds intermittently throughout the day       The Hook  Buccinator Massage   Place the thumb (if the unaffected side) deep inside your cheek in a location that you might think is particularly tight   Using your thumb, stretch the muscle outward, pusing it in the direction away from your teeth   Like a hook   Hold the stretch for 10-15 seconds   Relax the facial muscles   Repeat 3-5x  Consider stretching the same spot or a few different sports in the same tight cheek area     Eyebrow Stretch   Use a finger to press ON the brow at the end closest to the nose  Slowly press and pull outward toward the temple   Pause on any sore spots along the way until the discomfort begins to diminish   Continue outward all the way to the temple   Repeat 1-3x   Use a finger to press ABOVE the brow at the end closest to the nose  Slowly press and pull outward toward the temple   Pause on any sore spots along the way until the discomfort begins to diminish   Continue outward all the way to the temple   Repeat 1-3x   Use a finger to press BELOW the brow at the end closest to the nose  Slowly press and pull outward toward the temple   Pause on any sore spots along the way until the discomfort begins to diminish   Continue outward all the way to the temple   Repeat 1-3x      Around the Eye   Using pointer and middle fingers of both hands, start in the center of your eyebrow and pull away   Repeat 3x  Move to the temple, and using the same fingers, pull up towrads the ceiling and down to the floor   Repeat 3x  Move under the eye in the center, and using the same fingers, pull away  Repeat 3x     Forehead Stretch   Place four fingers on the brow on the affected side and stabilize it   Use four fingers on the opposite hand to pull the forehead muscle upward from just above your fingers slowly  toward your scalp   Pause on any sore or tender spots along the way until the discomfort begins to diminish   Continue through the stretch  Anticipate taking ~10 seconds through the stretch without pausing   Repeat 1-3x in each of the 3 overlapping sections of the affected side of the forehead      Turn-Key ZAID Stretch   Find your depressor muscle (this is the muscle that turn the corners of your mouth downward to a frown)  Using your thumb and pointer finger, with your thumb on the inside of your lip and the pointer on the outside, pinch the muscle   Turn your arm and hand like you are turning a key to a locked door-- in doing this you are stretching the muscle upward toward the corner of your lip  Move very slowly through this, pausing on any tight spots in this area   Repeat as needed until you feel this muscle as released     Eye to Cheek Nerve Retraining   Place your pointer fingers on your eyelids with your eyes open  Use your fingers to passively close your eyes   Feel fully relaxed through the cheeks   Slowly move the finger of the unaffected side off that eye  Slowly move the finger of the affected side off that eye   If your cheek triggers (I.e., if you feel tightness in your cheek), return the finger to the eye and try again moving to the spot where the cheek is not triggered   Slowly try to advance off the eye, the cheek must remain relaxed   If the cheek does not relax, return the finger to the eye and draw your attention to your cheek  Make sure the cheek fully relaxes and try again to slide the finger off the eye  Move slowly   Repeat multiple times-- trying to be mindful to allow the cheek to remain fully relaxed      Do this also with smiling slowly and holding just before your eye twitches.  Move into a pucker- holding your eyes steady and relaxed  Move into a snarl or stinky face -- keep your eye relaxed

## 2024-06-06 ENCOUNTER — THERAPY VISIT (OUTPATIENT)
Dept: SPEECH THERAPY | Facility: CLINIC | Age: 79
End: 2024-06-06
Payer: COMMERCIAL

## 2024-06-06 DIAGNOSIS — R13.11 ORAL PHASE DYSPHAGIA: ICD-10-CM

## 2024-06-06 DIAGNOSIS — R47.1 DYSARTHRIA: Primary | ICD-10-CM

## 2024-06-06 PROCEDURE — 92507 TX SP LANG VOICE COMM INDIV: CPT | Mod: GN | Performed by: SPEECH-LANGUAGE PATHOLOGIST

## 2024-06-06 NOTE — PATIENT INSTRUCTIONS
Wheel Massage   Consider your face a wheel and you are massaging the spokes of a wheel, from the outer rim to the center .  In that way, you will massage the entire side of your face in sections.  As you move along, if you find painful points, maintain the pressure on it until the pain diminishes, then continue on with the stretch.  For the best massage technique, you will want to use the hand that is opposite of your facial tightness.  For each section, stretch slowly, for ~8-10 seconds per area.  You will repeat each section three times in each muscle section or go around the half [place] of stretches 3 times (one group at a time).    For the first section, place your thumb in your mouth in front of your upper teeth and two or three fingers on the nose   Pull the tissue down with two or three fingers until you meet your thumb   Press together (from the inside and outside) while you stretch your face toward the center of your lips.    For this section, pull in a slight arc around your nostril and pull all the way through the lip.    The second section t is located under your eye, but do NOT pull the lower lid down  Start below the eye and pull straight towards the center of the lips   The third section is located at the temple where you will pull downward toward the center of the lips   For the fourth section, is located in front of your ear   Pull horizontally to the center of the lips   The final section is located under the jaw but more towards the affected side  Pull upward to the center of the lips   The thumb is inside between the front of the lower teeth and inside the chin     Stretches, Relaxation, Strategies to Reduce Synkinesis       Go Blah  Active Facial Relaxation  With this strategy, you are going to use your mind to relax your face.    Allow your jaw to drop down as if it is hanging like a hammock between two trees   Allow your back teeth to part slightly   Open your lips slightly as it feels  comfortable   Imaging your entire face is heavy and hanging downward   Consider using relaxing visual imagery to help   Relax in this manner for 5-10 seconds intermittently throughout the day       The Hook  Buccinator Massage   Place the thumb (if the unaffected side) deep inside your cheek in a location that you might think is particularly tight   Using your thumb, stretch the muscle outward, pusing it in the direction away from your teeth   Like a hook   Hold the stretch for 10-15 seconds   Relax the facial muscles   Repeat 3-5x  Consider stretching the same spot or a few different sports in the same tight cheek area     Eyebrow Stretch   Use a finger to press ON the brow at the end closest to the nose  Slowly press and pull outward toward the temple   Pause on any sore spots along the way until the discomfort begins to diminish   Continue outward all the way to the temple   Repeat 1-3x   Use a finger to press ABOVE the brow at the end closest to the nose  Slowly press and pull outward toward the temple   Pause on any sore spots along the way until the discomfort begins to diminish   Continue outward all the way to the temple   Repeat 1-3x   Use a finger to press BELOW the brow at the end closest to the nose  Slowly press and pull outward toward the temple   Pause on any sore spots along the way until the discomfort begins to diminish   Continue outward all the way to the temple   Repeat 1-3x      Around the Eye   Using pointer and middle fingers of both hands, start in the center of your eyebrow and pull away   Repeat 3x  Move to the temple, and using the same fingers, pull up towrads the ceiling and down to the floor   Repeat 3x  Move under the eye in the center, and using the same fingers, pull away  Repeat 3x     Forehead Stretch   Place four fingers on the brow on the affected side and stabilize it   Use four fingers on the opposite hand to pull the forehead muscle upward from just above your fingers slowly  toward your scalp   Pause on any sore or tender spots along the way until the discomfort begins to diminish   Continue through the stretch  Anticipate taking ~10 seconds through the stretch without pausing   Repeat 1-3x in each of the 3 overlapping sections of the affected side of the forehead      Turn-Key ZAID Stretch   Find your depressor muscle (this is the muscle that turn the corners of your mouth downward to a frown)  Using your thumb and pointer finger, with your thumb on the inside of your lip and the pointer on the outside, pinch the muscle   Turn your arm and hand like you are turning a key to a locked door-- in doing this you are stretching the muscle upward toward the corner of your lip  Move very slowly through this, pausing on any tight spots in this area   Repeat as needed until you feel this muscle as released     Eye to Cheek Nerve Retraining   Place your pointer fingers on your eyelids with your eyes open  Use your fingers to passively close your eyes   Feel fully relaxed through the cheeks   Slowly move the finger of the unaffected side off that eye  Slowly move the finger of the affected side off that eye   If your cheek triggers (I.e., if you feel tightness in your cheek), return the finger to the eye and try again moving to the spot where the cheek is not triggered   Slowly try to advance off the eye, the cheek must remain relaxed   If the cheek does not relax, return the finger to the eye and draw your attention to your cheek  Make sure the cheek fully relaxes and try again to slide the finger off the eye  Move slowly   Repeat multiple times-- trying to be mindful to allow the cheek to remain fully relaxed     After 10 repetitions, advance to slowly closing your eyes with your eye muscles and not your fingers keeping your face relaxed      'Creep Up' Technique: Facial Exercises to Reduce Synkinesis   Very slowly move into a facial movement but freeze the movement at the point where the eye  tightens   Shift or flop both eyes back and forth 5x to relax any tightening around the eye   If the eye completely relaxes, very slowly increase the amount of movement until the eye tightens again   Shift or flop both eyes back and forth 5x to relax any tightening around the eye   If the eye does not completely relax, very slowly back off of the amount of movement until the eye completely relaxes  Shift or flop both eyes back and forth and try to move 'forward' again   Relax  Repeat 3x     You may need to move 'forward' and 'backward' within one repetition of the exercise.  Take 30 seconds to work with this, only increasing the amount of movement as you can relax your eye.  The emphasis is on teaching the eye to relax more than increasing the amount of movement.      -Do this also with smiling slowly and holding just before your eye twitches.  -Move into a pucker- holding your eyes steady and relaxed  -Move into a snarl or stinky face -- keep your eye relaxed  -Move your right cheek up and out toward your ear - keep your eye relaxed.     Tongue strengthening exercises.     1. Stick your tongue straight out and hold 1 second. Do 10 repetitions 3-5 times daily.   2. Pull your tongue from the front of your mouth to the back of your mouth. Do 10 repetitions 3-5 times daily.   3. Touch the corner of your mouth on each side.  Make sure you move slowly, not having your jaw move while your tongue moves. Do 10 repetitions 3-5 times daily.   4. Lick your lips in a [place], slowly. Do 10 repetitions 3-5 times daily in each direction (clockwise and counter clockwise)

## 2024-07-31 ENCOUNTER — TELEPHONE (OUTPATIENT)
Dept: OTOLARYNGOLOGY | Facility: CLINIC | Age: 79
End: 2024-07-31

## 2024-07-31 ENCOUNTER — OFFICE VISIT (OUTPATIENT)
Dept: OTOLARYNGOLOGY | Facility: CLINIC | Age: 79
End: 2024-07-31
Payer: COMMERCIAL

## 2024-07-31 ENCOUNTER — THERAPY VISIT (OUTPATIENT)
Dept: SPEECH THERAPY | Facility: CLINIC | Age: 79
End: 2024-07-31
Payer: COMMERCIAL

## 2024-07-31 VITALS — HEIGHT: 63 IN | WEIGHT: 119 LBS | BODY MASS INDEX: 21.09 KG/M2

## 2024-07-31 DIAGNOSIS — R13.11 ORAL PHASE DYSPHAGIA: ICD-10-CM

## 2024-07-31 DIAGNOSIS — G51.31 HEMIFACIAL SPASM OF RIGHT SIDE OF FACE: ICD-10-CM

## 2024-07-31 DIAGNOSIS — G24.5 BLEPHAROSPASM OF RIGHT EYE: ICD-10-CM

## 2024-07-31 DIAGNOSIS — G51.9 SEVENTH CRANIAL NERVE DISEASE OR SYNDROME: ICD-10-CM

## 2024-07-31 DIAGNOSIS — R47.1 DYSARTHRIA: Primary | ICD-10-CM

## 2024-07-31 DIAGNOSIS — G24.3 SPASMODIC TORTICOLLIS: Primary | ICD-10-CM

## 2024-07-31 PROCEDURE — 64616 CHEMODENERV MUSC NECK DYSTON: CPT | Mod: RT | Performed by: OTOLARYNGOLOGY

## 2024-07-31 PROCEDURE — 92507 TX SP LANG VOICE COMM INDIV: CPT | Mod: GN | Performed by: SPEECH-LANGUAGE PATHOLOGIST

## 2024-07-31 PROCEDURE — 64612 DESTROY NERVE FACE MUSCLE: CPT | Mod: 50 | Performed by: OTOLARYNGOLOGY

## 2024-07-31 NOTE — LETTER
7/31/2024       RE: Alisson Roberts  [address] [place]  [facility] [address]     Dear Colleague,    Thank you for referring your patient, Alisson Roberts, to the [facility] [place] at [facility]. Please see a copy of my visit note below.    Facial Plastic and Reconstructive Surgery      Alisson Roberts presents today for evaluation of facial spasm. The patient has had the morbidity of facial nerve dysfunction and has significant morbidity, tightness and discomfort. Involuntary movement is also present around the eye and mouth which leads to discomfort, tightness and fatigue. We have discussed the hemifacial spasm and discomfort and chemodenervation as therapy. Current symptoms are a sequelae of the facial nerve injury. There are  involuntary and unwanted movements of the face that hinder good and functional intended movements.     PMH, PSH, meds and allergies are unchanged.    On exam there is a narrow right palpebral fissure. The eye closes involuntarily with speech. There is facial tightness and neck muscle spasms with smiling. Facial muscle excursion is limited due to the tightness. Oral commissure excursion on the right is approx 80% of normal. The contralateral side has compensatory hypercontracture and hyperfunction.     Assessment:  Clonic Hemifacial spasm  Spastic torticollis  Incomplete facial paralysis  Blepharospasm    Plan:   Chemodenervation of eye, face and neck today, bilateral  Continues to have benefits from treatment  We adapted treatment today based on symptoms and exam.      Procedure: Chemodenervation with Botulinum Toxin A  Indication: Hemifacial Spasm and Hypercontracture  Injector: Alayna Lai MD      Informed consent was obtained.  The skin was cleaned with antimicrobial solution and a topical ice was placed.     The patient was asked to systematically engage the muscles in the area to be injected.  The tuberculin needles were used for subdermal injection and hemostasis was obtained with light digital pressure when needed. The skin was cleaned.     A total of 70 units were injected.  Botulinum toxin A type: Botox  NDC [id number]  Waste 30 units       Please see procedure log.    The patient tolerated the procedure well and there were no complications. Post procedure care instructions were given to the patient.      I spent a total of 10 minutes face-to-face with Alisson Roberts during today's office visit.  Over 50% of this time was spent counseling the patient and/or coordinating care regarding the sequelae of facial paralysis and the morbidity associated with facial nerve dysfunction. The time includes reviewing past injections records, discussing effects and adapting to needs for treatment. The injection time is not included in this time and was a separate 7 minutes.  See note for details.      Again, thank you for allowing me to participate in the care of your patient.      Sincerely,    Alayna Lai MD

## 2024-07-31 NOTE — TELEPHONE ENCOUNTER
Left Voicemail (1st Attempt) for the patient to call back and schedule the following:    Appointment type: RTN    Provider: Ming  Return date: 10/30 or later  Specialty phone number: direct  Additional appointment(s) needed:   Additional Notes: 3 month follow up

## 2024-07-31 NOTE — PATIENT INSTRUCTIONS
Wheel Massage   Consider your face a wheel and you are massaging the spokes of a wheel, from the outer rim to the center .  In that way, you will massage the entire side of your face in sections.  As you move along, if you find painful points, maintain the pressure on it until the pain diminishes, then continue on with the stretch.  For the best massage technique, you will want to use the hand that is opposite of your facial tightness.  For each section, stretch slowly, for ~8-10 seconds per area.  You will repeat each section three times in each muscle section or go around the half Mcgrath of stretches 3 times (one group at a time).    For the first section, place your thumb in your mouth in front of your upper teeth and two or three fingers on the nose   Pull the tissue down with two or three fingers until you meet your thumb   Press together (from the inside and outside) while you stretch your face toward the center of your lips.    For this section, pull in a slight arc around your nostril and pull all the way through the lip.    The second section t is located under your eye, but do NOT pull the lower lid down  Start below the eye and pull straight towards the center of the lips   The third section is located at the temple where you will pull downward toward the center of the lips   For the fourth section, is located in front of your ear   Pull horizontally to the center of the lips   The final section is located under the jaw but more towards the affected side  Pull upward to the center of the lips   The thumb is inside between the front of the lower teeth and inside the chin     Stretches, Relaxation, Strategies to Reduce Synkinesis       Go Blah  Active Facial Relaxation  With this strategy, you are going to use your mind to relax your face.    Allow your jaw to drop down as if it is hanging like a hammock between two trees   Allow your back teeth to part slightly   Open your lips slightly as it feels  comfortable   Imaging your entire face is heavy and hanging downward   Consider using relaxing visual imagery to help   Relax in this manner for 5-10 seconds intermittently throughout the day       The Hook  Buccinator Massage   Place the thumb (if the unaffected side) deep inside your cheek in a location that you might think is particularly tight   Using your thumb, stretch the muscle outward, pusing it in the direction away from your teeth   Like a hook   Hold the stretch for 10-15 seconds   Relax the facial muscles   Repeat 3-5x  Consider stretching the same spot or a few different sports in the same tight cheek area     Eyebrow Stretch   Use a finger to press ON the brow at the end closest to the nose  Slowly press and pull outward toward the temple   Pause on any sore spots along the way until the discomfort begins to diminish   Continue outward all the way to the temple   Repeat 1-3x   Use a finger to press ABOVE the brow at the end closest to the nose  Slowly press and pull outward toward the temple   Pause on any sore spots along the way until the discomfort begins to diminish   Continue outward all the way to the temple   Repeat 1-3x   Use a finger to press BELOW the brow at the end closest to the nose  Slowly press and pull outward toward the temple   Pause on any sore spots along the way until the discomfort begins to diminish   Continue outward all the way to the temple   Repeat 1-3x      Around the Eye   Using pointer and middle fingers of both hands, start in the center of your eyebrow and pull away   Repeat 3x  Move to the temple, and using the same fingers, pull up towrads the ceiling and down to the floor   Repeat 3x  Move under the eye in the center, and using the same fingers, pull away  Repeat 3x     Forehead Stretch   Place four fingers on the brow on the affected side and stabilize it   Use four fingers on the opposite hand to pull the forehead muscle upward from just above your fingers slowly  toward your scalp   Pause on any sore or tender spots along the way until the discomfort begins to diminish   Continue through the stretch  Anticipate taking ~10 seconds through the stretch without pausing   Repeat 1-3x in each of the 3 overlapping sections of the affected side of the forehead      Turn-Key ZAID Stretch   Find your depressor muscle (this is the muscle that turn the corners of your mouth downward to a frown)  Using your thumb and pointer finger, with your thumb on the inside of your lip and the pointer on the outside, pinch the muscle   Turn your arm and hand like you are turning a key to a locked door-- in doing this you are stretching the muscle upward toward the corner of your lip  Move very slowly through this, pausing on any tight spots in this area   Repeat as needed until you feel this muscle as released     Eye to Cheek Nerve Retraining   Place your pointer fingers on your eyelids with your eyes open  Use your fingers to passively close your eyes   Feel fully relaxed through the cheeks   Slowly move the finger of the unaffected side off that eye  Slowly move the finger of the affected side off that eye   If your cheek triggers (I.e., if you feel tightness in your cheek), return the finger to the eye and try again moving to the spot where the cheek is not triggered   Slowly try to advance off the eye, the cheek must remain relaxed   If the cheek does not relax, return the finger to the eye and draw your attention to your cheek  Make sure the cheek fully relaxes and try again to slide the finger off the eye  Move slowly   Repeat multiple times-- trying to be mindful to allow the cheek to remain fully relaxed      After 10 repetitions, advance to slowly closing your eyes with your eye muscles and not your fingers keeping your face relaxed      'Creep Up' Technique: Facial Exercises to Reduce Synkinesis   Very slowly move into a facial movement but freeze the movement at the point where the eye  tightens   Shift or flop both eyes back and forth 5x to relax any tightening around the eye   If the eye completely relaxes, very slowly increase the amount of movement until the eye tightens again   Shift or flop both eyes back and forth 5x to relax any tightening around the eye   If the eye does not completely relax, very slowly back off of the amount of movement until the eye completely relaxes  Shift or flop both eyes back and forth and try to move 'forward' again   Relax  Repeat 3x     You may need to move 'forward' and 'backward' within one repetition of the exercise.  Take 30 seconds to work with this, only increasing the amount of movement as you can relax your eye.  The emphasis is on teaching the eye to relax more than increasing the amount of movement.       -Do this also with smiling slowly and holding just before your eye twitches.  -Move into a pucker- holding your eyes steady and relaxed  -Move into a snarl or stinky face -- keep your eye relaxed  -Move your right cheek up and out toward your ear - keep your eye relaxed.      Tongue strengthening exercises.      1. Stick your tongue straight out and hold 1 second. Do 10 repetitions 3-5 times daily.   2. Pull your tongue from the front of your mouth to the back of your mouth. Do 10 repetitions 3-5 times daily.   3. Touch the corner of your mouth on each side.  Make sure you move slowly, not having your jaw move while your tongue moves. Do 10 repetitions 3-5 times daily.   4. Lick your lips in a Ak Chin, slowly. Do 10 repetitions 3-5 times daily in each direction (clockwise and counter clockwise)

## 2024-07-31 NOTE — PROGRESS NOTES
[facility]                                                                                   OUTPATIENT SPEECH LANGUAGE PATHOLOGY    PLAN OF TREATMENT FOR OUTPATIENT REHABILITATION   Patient's Last Name, First Name, Alisson Jarrett YOB: 1945   Provider's Name   [facility]   Medical Record No.  [id number]     Onset Date:  9/2/22 Start of Care Date: 09/02/22     Medical Diagnosis:  Bell's Palsy      SLP Treatment Diagnosis: Non-verbal communication impairment, mild dysarthria, mild oral stage dysphagia  Plan of Treatment  Frequency/Duration: 1x/month  / 12 months     Certification date from (P) 07/24/24   To (P) 10/21/24          See note for plan of treatment details and functional goals     Therese Mtz, SLP                         I CERTIFY THE NEED FOR THESE SERVICES FURNISHED UNDER        THIS PLAN OF TREATMENT AND WHILE UNDER MY CARE     (Physician attestation of this document indicates review and certification of the therapy plan).              Referring Provider:  Dr. Alayna Lara    Initial Assessment  See Epic Evaluation- 09/02/22            PLAN  Continue therapy per current plan of care.    Beginning/End Dates of Progress Note Reporting Period:   4/24/24  to 07/31/2024    Referring Provider:  Dr. Alayna Lara         07/31/24 1045   Appointment Info   Treating Provider Therese Mtz MS, CCC-SLP   Medical Diagnosis Bell's Palsy   SLP Tx Diagnosis Non-verbal communication impairment, mild dysarthria, mild oral stage dysphagia   Session Number   Session Number 11   Progress Note/Certification   Start Of Care Date 09/02/22   Therapy Frequency 1x/month   Predicted Duration 12 months   Certification date from 07/24/24   Certification date to 10/21/24   Progress Note Due Date 10/21/24   Subjective Report   Subjective Report Pt seen today for therapy session. She reports feeling the botox has been helpful.    Functional Problems Anterior loss (improving), pocketing (improving), biting lip/cheek, slurred speech, dry eye   Objective Measures   Objective Measures Objective Measure 1;Objective Measure 2   Objective Measure 1   Objective Measure FaCE Instrument   Objective Measure 2   Objective Measure SAQ   Objective Measure 3   Objective Measure FGS   Resting Symmetry Location    Palpebral Fissure Eye Tone Narrow   Nasolabial Fold More Pronounced   Lips Elevated  (retracted moderately)   Voluntary Movement: Minimal Effort Eye Closure    Voluntary Movement: Minimal Effort Eye Closure 5.0   Minimal Effort Eye Closure Complete Yes   Minimal Effort Eye Closure Lack in mm 0mm   Minimal Effort Eye Closure-Synkinesis ;Levators   General Severity of Synkinesis mild   Voluntary Movement: Forehead   Voluntary Movement: Forehead Elevation 3   Forehead Elevation  Strength Rating % 40%   Forehead-Synkinesis Zygomaticus;Orbicularis Occuli;Risorius;; Levators;Other  (Buccinator)   General Severity of Synkinesis moderate to severe  (3.0)   Voluntary Movement: Open Mouth Smile   Voluntary Movement: Open Mouth Smile 3.0   Open Mouth Smile Strength Rating% 75%   Open Mouth Smile-Synkinesis Orbicularis Occuli;;Platysma;Mentalis; Levators;Zygomaticus;Other;Frontalis;Risorius  (Buccinator)   General Severity of Synkinesis severe   (3.0)   Voluntary Movement: Snarl   Voluntary Movement: Snarl 3.0   Snarl Strength Rating % 40%   Snarl-Synkinesis ;Orbicularis Occuli;Zygomaticus;Mentalis;Levators;Other;Platysma;Risorius  (Buccinator)   General Severity of Synkinesis severe   (3.0)   Voluntary Movement: Pucker   Voluntary Movement: Pucker 4.0   Pucker Strength Rating % 65%   Pucker-Synkinesis Orbicularis Occuli;;Zygomaticus;Risorius;Platysma;Other  (Buccinator)   General Severity of Synkinesis severe  (3.0)   Treatment Interventions (SLP)   Treatment Interventions Treatment Speech/Lang/Voice;Facial  Paralysis-Strengthening   Treatment Speech/Lang/Voice   Treatment of Speech, Language, Voice Communication&/or Auditory Processing (60107) 45 Minutes   Skilled Intervention Provided written and verbal information on.;Educated patient on risks.;Modeled compensatory strategies;Provided feedback on performance of tasks;Other  (Trained flaccid massage, exercises)   Patient Response/Progress Pt reported understanding and agreement   Treatment Detail See above   Progress Good.   Strengthening   Strengthening Eye Closure;Pucker   Eye Closure Active    Eye Closure Comments upper and lower lids   Smile Open Mouth Active;Active Assisted To Active   Smile Open Mouth Comments working to decreased eye to mouth synkinesis   Pucker Active   Pucker Comments   (decreasing eye movement)   Education   Learner/Method Patient;No Barriers to Learning   Plan   Plan for next session SLP: Advance stretches, re-assess, retraininging activities   Facial Paralysis Goal 1   Goal Identifier 1   Goal Description Pt will increase her FGS reflecting gains in resting symmetry, symmetry of movement, and reduced synkinesis (if present) when compared to her status noted at the initial evaluation.   Target Date 10/21/24   Goal Progress Pt demonstrates improved symmetry and slightly decreased synkinesis when the botox is not present. She has increased activation of obicularis occuli when she is moving her face. She feels this tightness is significant. She is hopeful there will be less activation after the botox today. Pt trained on activities to increase ROM of frontalis today.   Facial Paralysis Goal 3   Goal Identifier 3   Goal Description Pt will report a 25% improvement in articulartory precision when compared to her status at the initial evaluation.   Target Date 10/21/24   Goal Progress Alisson reports continued improvement in speech production. Se is able to demonstrate lingual exercises with improved ability to move her tongue independently. She will  continue lingual exercises.   Facial Paralysis Goal 4   Goal Identifier 4   Goal Description Pt will increase her FaCE Instrument score by 10 points reflecting gains in physical functioning and acceptance when compared to her score taken during the initial evaluation.   Target Date 10/21/24   Goal Progress Goal is ongoing. Pt reports feeling her face is more symmetrical and is able to be more relaxed throughout the day. She continues to have increased tension sometimes when she is talking a lot. Today she feels very tight as the botox has fully worn off.   Facial Paralysis Goal  5   Goal Identifier 5   Goal Description Pt will report a 25% reduction in tension and tightness to the R face when compared to her status noted on 1/6/2023.   Target Date 10/21/24   Goal Progress Alisson retrained on stretching activities. She requires min cues to complete correctly. She demonstrates decreased recall for tasks and thus was given written information again for home completion. She reports some areas of soreness during stretching today. She continues to have several areas of tenderness which she notes are tender most of the time. She will pause on stretches for the next two weeks as she is having Botox after the session today.   Clinical Impressions   Swallowing Diagnosis Mild oral phase dysphagia   Communication Diagnosis Non-verbal communication impairment, mild to moderate dysarthria   Facial Paralysis Goal 2   Goal Identifier 2   Goal Description Pt will demonstrate complete eye closure when compared to her status of 4 mm open as measured during the initial evaluation.   Goal Progress Goal met previously.   Target Date 02/28/23   Date Met 01/06/23   General Patient Information   Medical Diagnosis Other speech disturbances

## 2024-08-01 ENCOUNTER — TELEPHONE (OUTPATIENT)
Dept: PLASTIC SURGERY | Facility: CLINIC | Age: 79
End: 2024-08-01

## 2024-08-01 ENCOUNTER — TELEPHONE (OUTPATIENT)
Dept: OTOLARYNGOLOGY | Facility: CLINIC | Age: 79
End: 2024-08-01
Payer: COMMERCIAL

## 2024-08-01 NOTE — TELEPHONE ENCOUNTER
Patient confirmed scheduled appointment:  Date: 10/30  Time: 9  Provider: Ming  [place] [facility] [place]/imaging:   Additional notes:

## 2024-08-26 NOTE — PROGRESS NOTES
Facial Plastic and Reconstructive Surgery      Alisson Roberts presents today for evaluation of facial spasm. The patient has had the morbidity of facial nerve dysfunction and has significant morbidity, tightness and discomfort. Involuntary movement is also present around the eye and mouth which leads to discomfort, tightness and fatigue. We have discussed the hemifacial spasm and discomfort and chemodenervation as therapy. Current symptoms are a sequelae of the facial nerve injury. There are  involuntary and unwanted movements of the face that hinder good and functional intended movements.     PMH, PSH, meds and allergies are unchanged.    On exam there is a narrow right palpebral fissure. The eye closes involuntarily with speech. There is facial tightness and neck muscle spasms with smiling. Facial muscle excursion is limited due to the tightness. Oral commissure excursion on the right is approx 80% of normal. The contralateral side has compensatory hypercontracture and hyperfunction.     Assessment:  Clonic Hemifacial spasm  Spastic torticollis  Incomplete facial paralysis  Blepharospasm    Plan:   Chemodenervation of eye, face and neck today, bilateral  Continues to have benefits from treatment  We adapted treatment today based on symptoms and exam.      Procedure: Chemodenervation with Botulinum Toxin A  Indication: Hemifacial Spasm and Hypercontracture  Injector: Alayna Lai MD      Informed consent was obtained.  The skin was cleaned with antimicrobial solution and a topical ice was placed.     The patient was asked to systematically engage the muscles in the area to be injected. The tuberculin needles were used for subdermal injection and hemostasis was obtained with light digital pressure when needed. The skin was cleaned.     A total of 70 units were injected.  Botulinum toxin A type: Botox  NDC [id number]  Waste 30 units       Please see procedure log.    The patient tolerated the procedure well  and there were no complications. Post procedure care instructions were given to the patient.      I spent a total of 10 minutes face-to-face with Alisson Roberts during today's office visit.  Over 50% of this time was spent counseling the patient and/or coordinating care regarding the sequelae of facial paralysis and the morbidity associated with facial nerve dysfunction. The time includes reviewing past injections records, discussing effects and adapting to needs for treatment. The injection time is not included in this time and was a separate 7 minutes.  See note for details.

## 2024-10-30 ENCOUNTER — OFFICE VISIT (OUTPATIENT)
Dept: OTOLARYNGOLOGY | Facility: CLINIC | Age: 79
End: 2024-10-30
Payer: COMMERCIAL

## 2024-10-30 ENCOUNTER — THERAPY VISIT (OUTPATIENT)
Dept: SPEECH THERAPY | Facility: CLINIC | Age: 79
End: 2024-10-30
Payer: COMMERCIAL

## 2024-10-30 VITALS — WEIGHT: 119 LBS | HEIGHT: 63 IN | BODY MASS INDEX: 21.09 KG/M2

## 2024-10-30 DIAGNOSIS — G51.9 SEVENTH CRANIAL NERVE DISEASE OR SYNDROME: ICD-10-CM

## 2024-10-30 DIAGNOSIS — R47.1 DYSARTHRIA: Primary | ICD-10-CM

## 2024-10-30 DIAGNOSIS — R13.11 ORAL PHASE DYSPHAGIA: ICD-10-CM

## 2024-10-30 DIAGNOSIS — G51.31 HEMIFACIAL SPASM OF RIGHT SIDE OF FACE: ICD-10-CM

## 2024-10-30 DIAGNOSIS — G24.3 SPASMODIC TORTICOLLIS: Primary | ICD-10-CM

## 2024-10-30 DIAGNOSIS — G24.5 BLEPHAROSPASM OF RIGHT EYE: ICD-10-CM

## 2024-10-30 DIAGNOSIS — H53.483 VISUAL FIELD CONTRACTION, BILATERAL: ICD-10-CM

## 2024-10-30 PROCEDURE — 64612 DESTROY NERVE FACE MUSCLE: CPT | Mod: 50 | Performed by: OTOLARYNGOLOGY

## 2024-10-30 PROCEDURE — 99212 OFFICE O/P EST SF 10 MIN: CPT | Mod: 25 | Performed by: OTOLARYNGOLOGY

## 2024-10-30 PROCEDURE — 64616 CHEMODENERV MUSC NECK DYSTON: CPT | Mod: RT | Performed by: OTOLARYNGOLOGY

## 2024-10-30 PROCEDURE — 92507 TX SP LANG VOICE COMM INDIV: CPT | Mod: GN | Performed by: SPEECH-LANGUAGE PATHOLOGIST

## 2024-10-30 NOTE — PATIENT INSTRUCTIONS
Wheel Massage   Consider your face a wheel and you are massaging the spokes of a wheel, from the outer rim to the center .  In that way, you will massage the entire side of your face in sections.  As you move along, if you find painful points, maintain the pressure on it until the pain diminishes, then continue on with the stretch.  For the best massage technique, you will want to use the hand that is opposite of your facial tightness.  For each section, stretch slowly, for ~8-10 seconds per area.  You will repeat each section three times in each muscle section or go around the half [place] of stretches 3 times (one group at a time).    For the first section, place your thumb in your mouth in front of your upper teeth and two or three fingers on the nose   Pull the tissue down with two or three fingers until you meet your thumb   Press together (from the inside and outside) while you stretch your face toward the center of your lips.    For this section, pull in a slight arc around your nostril and pull all the way through the lip.    The second section t is located under your eye, but do NOT pull the lower lid down  Start below the eye and pull straight towards the center of the lips   The third section is located at the temple where you will pull downward toward the center of the lips   For the fourth section, is located in front of your ear   Pull horizontally to the center of the lips   The final section is located under the jaw but more towards the affected side  Pull upward to the center of the lips   The thumb is inside between the front of the lower teeth and inside the chin     Stretches, Relaxation, Strategies to Reduce Synkinesis       Go Blah  Active Facial Relaxation  With this strategy, you are going to use your mind to relax your face.    Allow your jaw to drop down as if it is hanging like a hammock between two trees   Allow your back teeth to part slightly   Open your lips slightly as it feels  comfortable   Imaging your entire face is heavy and hanging downward   Consider using relaxing visual imagery to help   Relax in this manner for 5-10 seconds intermittently throughout the day       The Hook  Buccinator Massage   Place the thumb (if the unaffected side) deep inside your cheek in a location that you might think is particularly tight   Using your thumb, stretch the muscle outward, pusing it in the direction away from your teeth   Like a hook   Hold the stretch for 10-15 seconds   Relax the facial muscles   Repeat 3-5x  Consider stretching the same spot or a few different sports in the same tight cheek area     Eyebrow Stretch   Use a finger to press ON the brow at the end closest to the nose  Slowly press and pull outward toward the temple   Pause on any sore spots along the way until the discomfort begins to diminish   Continue outward all the way to the temple   Repeat 1-3x   Use a finger to press ABOVE the brow at the end closest to the nose  Slowly press and pull outward toward the temple   Pause on any sore spots along the way until the discomfort begins to diminish   Continue outward all the way to the temple   Repeat 1-3x   Use a finger to press BELOW the brow at the end closest to the nose  Slowly press and pull outward toward the temple   Pause on any sore spots along the way until the discomfort begins to diminish   Continue outward all the way to the temple   Repeat 1-3x      Around the Eye   Using pointer and middle fingers of both hands, start in the center of your eyebrow and pull away   Repeat 3x  Move to the temple, and using the same fingers, pull up towrads the ceiling and down to the floor   Repeat 3x  Move under the eye in the center, and using the same fingers, pull away  Repeat 3x     Forehead Stretch   Place four fingers on the brow on the affected side and stabilize it   Use four fingers on the opposite hand to pull the forehead muscle upward from just above your fingers slowly  toward your scalp   Pause on any sore or tender spots along the way until the discomfort begins to diminish   Continue through the stretch  Anticipate taking ~10 seconds through the stretch without pausing   Repeat 1-3x in each of the 3 overlapping sections of the affected side of the forehead      Turn-Key ZAID Stretch   Find your depressor muscle (this is the muscle that turn the corners of your mouth downward to a frown)  Using your thumb and pointer finger, with your thumb on the inside of your lip and the pointer on the outside, pinch the muscle   Turn your arm and hand like you are turning a key to a locked door-- in doing this you are stretching the muscle upward toward the corner of your lip  Move very slowly through this, pausing on any tight spots in this area   Repeat as needed until you feel this muscle as released     Eye to Cheek Nerve Retraining   Place your pointer fingers on your eyelids with your eyes open  Use your fingers to passively close your eyes   Feel fully relaxed through the cheeks   Slowly move the finger of the unaffected side off that eye  Slowly move the finger of the affected side off that eye   If your cheek triggers (I.e., if you feel tightness in your cheek), return the finger to the eye and try again moving to the spot where the cheek is not triggered   Slowly try to advance off the eye, the cheek must remain relaxed   If the cheek does not relax, return the finger to the eye and draw your attention to your cheek  Make sure the cheek fully relaxes and try again to slide the finger off the eye  Move slowly   Repeat multiple times-- trying to be mindful to allow the cheek to remain fully relaxed      After 10 repetitions, advance to slowly closing your eyes with your eye muscles and not your fingers keeping your face relaxed      'Creep Up' Technique: Facial Exercises to Reduce Synkinesis   Very slowly move into a facial movement but freeze the movement at the point where the eye  tightens   Shift or flop both eyes back and forth 5x to relax any tightening around the eye   If the eye completely relaxes, very slowly increase the amount of movement until the eye tightens again   Shift or flop both eyes back and forth 5x to relax any tightening around the eye   If the eye does not completely relax, very slowly back off of the amount of movement until the eye completely relaxes  Shift or flop both eyes back and forth and try to move 'forward' again   Relax  Repeat 3x     You may need to move 'forward' and 'backward' within one repetition of the exercise.  Take 30 seconds to work with this, only increasing the amount of movement as you can relax your eye.  The emphasis is on teaching the eye to relax more than increasing the amount of movement.       -Do this also with smiling slowly and holding just before your eye twitches.  -Move into a pucker- holding your eyes steady and relaxed  -Move into a snarl or stinky face -- keep your eye relaxed  -Move your right cheek up and out toward your ear - keep your eye relaxed.      Tongue strengthening exercises.      1. Stick your tongue straight out and hold 1 second. Do 10 repetitions 3-5 times daily.   2. Pull your tongue from the front of your mouth to the back of your mouth. Do 10 repetitions 3-5 times daily.   3. Touch the corner of your mouth on each side.  Make sure you move slowly, not having your jaw move while your tongue moves. Do 10 repetitions 3-5 times daily.   4. Lick your lips in a [place], slowly. Do 10 repetitions 3-5 times daily in each direction (clockwise and counter clockwise)

## 2024-10-30 NOTE — PROGRESS NOTES
Facial Plastic and Reconstructive Surgery      Alisson Roberts presents today for evaluation of facial spasm. The patient has had the morbidity of facial nerve dysfunction and has significant morbidity, tightness and discomfort. Involuntary movement is also present around the eye and mouth which leads to discomfort, tightness and fatigue. We have discussed the hemifacial spasm and discomfort and chemodenervation as therapy. Current symptoms are a sequelae of the facial nerve injury. There are  involuntary and unwanted movements of the face that hinder good and functional intended movements.     She also describes challenges with the excess skin of her upper eyelids. She feels like her vision is obstructed and has had visual testing in the past. She also will be having her cataracts addressed surgically.     PMH, PSH, meds and allergies are unchanged.    On exam there is a narrow right palpebral fissure. The eye closes involuntarily with speech. There is facial tightness and neck muscle spasms with smiling. Facial muscle excursion is limited due to the tightness. Oral commissure excursion on the right is approx 80% of normal. The contralateral side has compensatory hypercontracture and hyperfunction.     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  Visual field testing, she would like to defer until she has her cataracts treated      Procedure: Chemodenervation with Botulinum Toxin A  Indication: Hemifacial Spasm and Hypercontracture  Injector: Alayna Lai MD      Informed consent was obtained.  The skin was cleaned with antimicrobial solution and a topical ice was placed.     The patient was asked to systematically engage the muscles in  the area to be injected. The tuberculin needles were used for subdermal injection and hemostasis was obtained with light digital pressure when needed. The skin was cleaned.     A total of 70 units were injected.  Botulinum toxin A type: Botox  NDC [id number]  Waste 30 units       Please see procedure log.    The patient tolerated the procedure well and there were no complications. Post procedure care instructions were given to the patient.      I spent a total of 10 minutes face-to-face with Alisson Roberts during today's office visit.  Over 50% of this time was spent counseling the patient and/or coordinating care regarding the sequelae of facial paralysis and the morbidity associated with facial nerve dysfunction. The time includes reviewing past injections records, discussing effects and adapting to needs for treatment. The injection time is not included in this time and was a separate 7 minutes.  See note for details.

## 2024-10-30 NOTE — LETTER
10/30/2024       RE: Alisson Roberts  [address]  [facility] [address]     Dear Colleague,    Thank you for referring your patient, Alisson Roberts, to the [facility] [place] at [facility]. Please see a copy of my visit note below.    Facial Plastic and Reconstructive Surgery      Alisson Roberts presents today for evaluation of facial spasm. The patient has had the morbidity of facial nerve dysfunction and has significant morbidity, tightness and discomfort. Involuntary movement is also present around the eye and mouth which leads to discomfort, tightness and fatigue. We have discussed the hemifacial spasm and discomfort and chemodenervation as therapy. Current symptoms are a sequelae of the facial nerve injury. There are  involuntary and unwanted movements of the face that hinder good and functional intended movements.     She also describes challenges with the excess skin of her upper eyelids. She feels like her vision is obstructed and has had visual testing in the past. She also will be having her cataracts addressed surgically.     PMH, PSH, meds and allergies are unchanged.    On exam there is a narrow right palpebral fissure. The eye closes involuntarily with speech. There is facial tightness and neck muscle spasms with smiling. Facial muscle excursion is limited due to the tightness. Oral commissure excursion on the right is approx 80% of normal. The contralateral side has compensatory hypercontracture and hyperfunction.     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  Visual field testing, she would like to defer until she has her cataracts  treated      Procedure: Chemodenervation with Botulinum Toxin A  Indication: Hemifacial Spasm and Hypercontracture  Injector: Alayna Lai MD      Informed consent was obtained.  The skin was cleaned with antimicrobial solution and a topical ice was placed.     The patient was asked to systematically engage the muscles in the area to be injected. The tuberculin needles were used for subdermal injection and hemostasis was obtained with light digital pressure when needed. The skin was cleaned.     A total of 70 units were injected.  Botulinum toxin A type: Botox  NDC [id number]  Waste 30 units       Please see procedure log.    The patient tolerated the procedure well and there were no complications. Post procedure care instructions were given to the patient.      I spent a total of 10 minutes face-to-face with Alisson Roberts during today's office visit.  Over 50% of this time was spent counseling the patient and/or coordinating care regarding the sequelae of facial paralysis and the morbidity associated with facial nerve dysfunction. The time includes reviewing past injections records, discussing effects and adapting to needs for treatment. The injection time is not included in this time and was a separate 7 minutes.  See note for details.      Again, thank you for allowing me to participate in the care of your patient.      Sincerely,    Alayna Lai MD

## 2024-11-11 NOTE — PROGRESS NOTES
[facility]                                                                                   OUTPATIENT SPEECH LANGUAGE PATHOLOGY    PLAN OF TREATMENT FOR OUTPATIENT REHABILITATION   Patient's Last Name, First Name, Alisson Jarrett YOB: 1945   Provider's Name   [facility]   Medical Record No.  [id number]     Onset Date:   Start of Care Date: 09/02/22     Medical Diagnosis:  Bell's Palsy      SLP Treatment Diagnosis: Non-verbal communication impairment, mild dysarthria, mild oral stage dysphagia  Plan of Treatment  Frequency/Duration: 1x/month  / 12 months     Certification date from 10/22/24   To 01/19/25          See note for plan of treatment details and functional goals     Therese Mtz, SLP                         I CERTIFY THE NEED FOR THESE SERVICES FURNISHED UNDER        THIS PLAN OF TREATMENT AND WHILE UNDER MY CARE     (Physician attestation of this document indicates review and certification of the therapy plan).              Referring Provider:  Dr. Alayna Calderon    Initial Assessment  See Epic Evaluation- 09/02/22             10/30/24 0800   Appointment Info   Treating Provider Therese Mtz MS, CCC-SLP   Medical Diagnosis Bell's Palsy   SLP Tx Diagnosis Non-verbal communication impairment, mild dysarthria, mild oral stage dysphagia   Session Number   Session Number 12   Progress Note/Certification   Start Of Care Date 09/02/22   Therapy Frequency 1x/month   Predicted Duration 12 months   Certification date from 10/22/24   Certification date to 01/19/25   Subjective Report   Subjective Report Pt seen today for therapy session. She reports feeling her face is more tense as she is to get botox today. She has been trying to do her exercises daily.   Functional Problems Anterior loss (improving), pocketing (improving), biting lip/cheek, slurred speech, dry eye   Objective Measures   Objective Measures Objective  Measure 1;Objective Measure 2   Objective Measure 1   Objective Measure FaCE Instrument   Details 44/100  (just before botox injections)   Objective Measure 2   Objective Measure SAQ   Details 42.22   Objective Measure 3   Objective Measure FGS   Details 41.67   Resting Symmetry Location    Palpebral Fissure Eye Tone Narrow   Nasolabial Fold More Pronounced   Lips Elevated  (retracted moderately)   Voluntary Movement: Minimal Effort Eye Closure    Voluntary Movement: Minimal Effort Eye Closure 5.0   Minimal Effort Eye Closure Complete Yes   Minimal Effort Eye Closure Lack in mm 0mm   Minimal Effort Eye Closure-Synkinesis ;Levators   General Severity of Synkinesis mild   Voluntary Movement: Forehead   Voluntary Movement: Forehead Elevation 3   Forehead Elevation  Strength Rating % 40%   Forehead-Synkinesis Zygomaticus;Orbicularis Occuli;Risorius;; Levators;Other  (Buccinator)   General Severity of Synkinesis moderate to severe  (3.0)   Voluntary Movement: Open Mouth Smile   Voluntary Movement: Open Mouth Smile 3.0   Open Mouth Smile Strength Rating% 75%   Open Mouth Smile-Synkinesis Orbicularis Occuli;;Platysma;Mentalis; Levators;Zygomaticus;Other;Frontalis;Risorius  (Buccinator)   General Severity of Synkinesis severe   (3.0)   Voluntary Movement: Snarl   Voluntary Movement: Snarl 3.0   Snarl Strength Rating % 40%   Snarl-Synkinesis ;Orbicularis Occuli;Zygomaticus;Mentalis;Levators;Other;Platysma;Risorius  (Buccinator)   General Severity of Synkinesis severe   (3.0)   Voluntary Movement: Pucker   Voluntary Movement: Pucker 4.0   Pucker Strength Rating % 65%   Pucker-Synkinesis Orbicularis Occuli;;Zygomaticus;Risorius;Platysma;Other  (Buccinator)   General Severity of Synkinesis severe  (3.0)   Treatment Interventions (SLP)   Treatment Interventions Treatment Speech/Lang/Voice;Facial Paralysis-Strengthening   Treatment Speech/Lang/Voice   Treatment of Speech,  Language, Voice Communication&/or Auditory Processing (35140) 40 Minutes   Skilled Intervention Provided written and verbal information on.;Educated patient on risks.;Modeled compensatory strategies;Provided feedback on performance of tasks;Other  (Trained flaccid massage, exercises)   Patient Response/Progress Pt reported understanding and agreement   Treatment Detail See above   Progress Good.   Strengthening   Strengthening Eye Closure;Pucker   Eye Closure Active    Eye Closure Comments upper and lower lids   Smile Open Mouth Active;Active Assisted To Active   Smile Open Mouth Comments working to decreased eye to mouth synkinesis   Pucker Active   Pucker Comments   (decreasing eye movement)   Education   Learner/Method Patient;No Barriers to Learning   Plan   Plan for next session SLP: Advance stretches, re-assess, retraininging activities   Total Session Time   Total Treatment Time (sum of timed and untimed services) 40   Facial Paralysis Goal 3   Goal Identifier 3   Goal Description Pt will report a 25% improvement in articulartory precision when compared to her status at the initial evaluation.   Target Date 01/19/25   Goal Progress Goal Extended. Pt continues to demonstrate improved articulatory precision however this is somewhat dependent on where she is in her botox cycle and where her stress.   Facial Paralysis Goal 4   Goal Identifier 4   Goal Description Pt will increase her FaCE Instrument score by 10 points reflecting gains in physical functioning and acceptance when compared to her score taken during the initial evaluation.   Target Date 01/19/25   Goal Progress Goal is oingoing. Pt continues to demonstrate variable FaCE instrument pending where she is in  her botox cycle. She demonstrates some improved acceptance of the condition of her face and the struggles with rehabilitation of the facial nerve.   Facial Paralysis Goal  5   Goal Identifier 5   Goal Description Pt will report a 25% reduction in  tension and tightness to the R face when compared to her status noted on 1/6/2023.   Target Date 01/19/25   Goal Progress Goal extended. Pt retrained on stretches to minimize tension within the muscle as well as activities to decrease synkinesis. Pt is better able to demonstrate these activities more independently today however still did benefit from some instruction. She is reporting increased consistency of completion at home.   Clinical Impressions   Swallowing Diagnosis Mild oral phase dysphagia   Communication Diagnosis Non-verbal communication impairment, mild to moderate dysarthria   Facial Paralysis Goal 2   Goal Identifier 2   Goal Description Pt will demonstrate complete eye closure when compared to her status of 4 mm open as measured during the initial evaluation.   Goal Progress Goal met previously.   Target Date 02/28/23   Date Met 01/06/23   General Patient Information   Medical Diagnosis Other speech disturbances     I referred the patient for evaluation and treatment and agree with the note provided by the rehabilitation specialist.     Alayna Lai MD

## 2025-01-07 ENCOUNTER — APPOINTMENT (OUTPATIENT)
Dept: URBAN - METROPOLITAN AREA CLINIC 257 | Age: 80
Setting detail: DERMATOLOGY
End: 2025-01-08

## 2025-01-07 DIAGNOSIS — L82.0 INFLAMED SEBORRHEIC KERATOSIS: ICD-10-CM

## 2025-01-07 DIAGNOSIS — L82.1 OTHER SEBORRHEIC KERATOSIS: ICD-10-CM

## 2025-01-07 DIAGNOSIS — D18.0 HEMANGIOMA: ICD-10-CM

## 2025-01-07 DIAGNOSIS — L81.4 OTHER MELANIN HYPERPIGMENTATION: ICD-10-CM

## 2025-01-07 DIAGNOSIS — L57.0 ACTINIC KERATOSIS: ICD-10-CM

## 2025-01-07 DIAGNOSIS — D22 MELANOCYTIC NEVI: ICD-10-CM

## 2025-01-07 DIAGNOSIS — Z71.89 OTHER SPECIFIED COUNSELING: ICD-10-CM

## 2025-01-07 DIAGNOSIS — L57.8 OTHER SKIN CHANGES DUE TO CHRONIC EXPOSURE TO NONIONIZING RADIATION: ICD-10-CM

## 2025-01-07 DIAGNOSIS — L40.0 PSORIASIS VULGARIS: ICD-10-CM

## 2025-01-07 PROBLEM — D22.5 MELANOCYTIC NEVI OF TRUNK: Status: ACTIVE | Noted: 2025-01-07

## 2025-01-07 PROBLEM — D18.01 HEMANGIOMA OF SKIN AND SUBCUTANEOUS TISSUE: Status: ACTIVE | Noted: 2025-01-07

## 2025-01-07 PROCEDURE — OTHER PRESCRIPTION MEDICATION MANAGEMENT: OTHER

## 2025-01-07 PROCEDURE — OTHER MIPS QUALITY: OTHER

## 2025-01-07 PROCEDURE — OTHER COUNSELING: OTHER

## 2025-01-07 PROCEDURE — 17110 DESTRUCT B9 LESION 1-14: CPT

## 2025-01-07 PROCEDURE — OTHER PRESCRIPTION: OTHER

## 2025-01-07 PROCEDURE — 17000 DESTRUCT PREMALG LESION: CPT | Mod: 59

## 2025-01-07 PROCEDURE — 99204 OFFICE O/P NEW MOD 45 MIN: CPT | Mod: 25

## 2025-01-07 PROCEDURE — OTHER LIQUID NITROGEN: OTHER

## 2025-01-07 RX ORDER — CLOBETASOL PROPIONATE 0.5 MG/G
CREAM TOPICAL BID
Qty: 60 | Refills: 2 | Status: ERX | COMMUNITY
Start: 2025-01-07

## 2025-01-07 RX ORDER — CLOBETASOL PROPIONATE 0.5 MG/ML
SOLUTION TOPICAL
Qty: 50 | Refills: 4 | Status: ERX | COMMUNITY
Start: 2025-01-07

## 2025-01-07 RX ORDER — KETOCONAZOLE 20 MG/ML
SHAMPOO, SUSPENSION TOPICAL
Qty: 120 | Refills: 4 | Status: ERX | COMMUNITY
Start: 2025-01-07

## 2025-01-07 ASSESSMENT — LOCATION ZONE DERM
LOCATION ZONE: TRUNK
LOCATION ZONE: SCALP
LOCATION ZONE: FACE
LOCATION ZONE: EAR

## 2025-01-07 ASSESSMENT — LOCATION DETAILED DESCRIPTION DERM
LOCATION DETAILED: LEFT RIB CAGE
LOCATION DETAILED: LEFT INFERIOR FOREHEAD
LOCATION DETAILED: SUBXIPHOID
LOCATION DETAILED: LEFT CAVUM CONCHA
LOCATION DETAILED: RIGHT MID TEMPLE
LOCATION DETAILED: LEFT MEDIAL BREAST 6-7:00 REGION
LOCATION DETAILED: LEFT INFERIOR LATERAL UPPER BACK
LOCATION DETAILED: RIGHT INFRAMAMMARY CREASE (INNER QUADRANT)
LOCATION DETAILED: RIGHT RIB CAGE
LOCATION DETAILED: LEFT MEDIAL UPPER BACK
LOCATION DETAILED: POSTERIOR MID-PARIETAL SCALP
LOCATION DETAILED: LEFT INFERIOR LATERAL FOREHEAD
LOCATION DETAILED: RIGHT INFERIOR FOREHEAD
LOCATION DETAILED: LEFT MEDIAL BREAST 7-8:00 REGION
LOCATION DETAILED: LEFT SUPERIOR MEDIAL UPPER BACK
LOCATION DETAILED: RIGHT CAVUM CONCHA

## 2025-01-07 ASSESSMENT — LOCATION SIMPLE DESCRIPTION DERM
LOCATION SIMPLE: POSTERIOR SCALP
LOCATION SIMPLE: LEFT UPPER BACK
LOCATION SIMPLE: LEFT BREAST
LOCATION SIMPLE: RIGHT TEMPLE
LOCATION SIMPLE: LEFT EAR
LOCATION SIMPLE: RIGHT EAR
LOCATION SIMPLE: RIGHT FOREHEAD
LOCATION SIMPLE: LEFT FOREHEAD
LOCATION SIMPLE: RIGHT BREAST
LOCATION SIMPLE: ABDOMEN

## 2025-01-07 NOTE — HPI: FULL BODY SKIN EXAMINATION
How Severe Are Your Spot(S)?: mild
What Type Of Note Output Would You Prefer (Optional)?: Standard Output
What Is The Reason For Today's Visit?: Full Body Skin Examination
What Is The Reason For Today's Visit? (Being Monitored For X): concerning skin lesions on an annual basis
Additional History: Pt reports she has a few specific lesions of concern located on her left flank and on her scalp. She states the lesion on her left flank is itchy and irritated and never resolves. She notes when she sweats a lot this lesion becomes raw. She explains the lesions on her scalp and scab like bumps that are very itchy. She notes he ears also become dry and flaky and she is currently using Nystatin on them. She also mentions she would like her keratoses located under her breasts treated at this time if possible. She is seeking further evaluation of her current skin lesions at this time.

## 2025-01-07 NOTE — PROCEDURE: PRESCRIPTION MEDICATION MANAGEMENT
Initiate Treatment: clobetasol 0.05 % scalp solution \\nketoconazole 2 % shampoo 2-3 x weekly \\nclobetasol 0.05 % topical cream BID x 2 weeks
Detail Level: Zone
Plan: RTC if Psoriasis patch on left flank does not improve
Render In Strict Bullet Format?: No

## 2025-01-07 NOTE — PROCEDURE: LIQUID NITROGEN
Detail Level: Zone
Include Z78.9 (Other Specified Conditions Influencing Health Status) As An Associated Diagnosis?: No
Duration Of Freeze Thaw-Cycle (Seconds): 0
Total Number Of Lesions Treated: 10
Post-Care Instructions: I reviewed with the patient in detail post-care instructions. Patient is to wear sunprotection, and avoid picking at any of the treated lesions. Pt may apply Vaseline to crusted or scabbing areas.
Render Post Care In The Note?: yes
Medical Necessity Clause: This procedure was medically necessary because the lesions that were treated were:
Medical Necessity Information: It is in your best interest to select a reason for this procedure from the list below. All of these items fulfill various CMS LCD requirements except the new and changing color options.
Spray Paint Text: The liquid nitrogen was applied to the skin utilizing a spray paint frosting technique.
Consent: The patient's consent was obtained including but not limited to risks of crusting, scabbing, blistering, scarring, darker or lighter pigmentary change, recurrence, incomplete removal and infection.
Detail Level: Detailed

## 2025-01-29 ENCOUNTER — OFFICE VISIT (OUTPATIENT)
Dept: OTOLARYNGOLOGY | Facility: CLINIC | Age: 80
End: 2025-01-29
Payer: COMMERCIAL

## 2025-01-29 VITALS — WEIGHT: 119 LBS | HEIGHT: 63 IN | BODY MASS INDEX: 21.09 KG/M2

## 2025-01-29 DIAGNOSIS — G51.9 SEVENTH CRANIAL NERVE DISEASE OR SYNDROME: ICD-10-CM

## 2025-01-29 DIAGNOSIS — G51.31 HEMIFACIAL SPASM OF RIGHT SIDE OF FACE: ICD-10-CM

## 2025-01-29 DIAGNOSIS — G24.5 BLEPHAROSPASM OF RIGHT EYE: ICD-10-CM

## 2025-01-29 DIAGNOSIS — G24.3 SPASMODIC TORTICOLLIS: Primary | ICD-10-CM

## 2025-01-29 RX ORDER — CLOBETASOL PROPIONATE 0.5 MG/G
CREAM TOPICAL
COMMUNITY
Start: 2025-01-07

## 2025-01-29 RX ORDER — CLOBETASOL PROPIONATE 0.5 MG/ML
SOLUTION TOPICAL
COMMUNITY
Start: 2025-01-07

## 2025-01-29 NOTE — PROGRESS NOTES
Facial Plastic and Reconstructive Surgery      Alisson Roberts presents today for evaluation of facial spasm. The patient has had the morbidity of facial nerve dysfunction and has significant morbidity, tightness and discomfort. Involuntary movement is also present around the eye and mouth which leads to discomfort, tightness and fatigue. We have discussed the hemifacial spasm and discomfort and chemodenervation as therapy. Current symptoms are a sequelae of the facial nerve injury. There are  involuntary and unwanted movements of the face that hinder good and functional intended movements.     She has cataract surgery and feels her vision is improved, however she still feels like the right involuntary movements affect her vision.     PMH, PSH, meds and allergies are unchanged.    On exam there is a narrow right palpebral fissure. The eye closes involuntarily with speech. There is facial tightness and neck muscle spasms with smiling. Facial muscle excursion is limited due to the tightness. Oral commissure excursion on the right is approx 80% of normal. The contralateral side has compensatory hypercontracture and hyperfunction.     Assessment:  Hemifacial spasm  Spasmodic torticollis  Blepharospasm  Seventh cranial nerve disorder     Plan:   Chemodenervation of eye, face and neck today, bilateral  Continues to have benefits from treatment  We adapted treatment today based on symptoms and exam.      Procedure: Chemodenervation with Botulinum Toxin A  Indication: Hemifacial Spasm and Hypercontracture  Injector: Alayna aLi MD      Informed consent was obtained.  The skin was cleaned with antimicrobial solution and a topical ice was placed.     The patient was asked to systematically engage the muscles in the area to be injected. The tuberculin needles were used for subdermal injection and hemostasis was obtained with light digital pressure when needed. The skin was cleaned.     A total of 70 units were  injected.  Botulinum toxin A type: Botox  NDC [id number]  Waste 30 units       Please see procedure log.    The patient tolerated the procedure well and there were no complications. Post procedure care instructions were given to the patient.      I spent a total of 10 minutes face-to-face with Alisson Roberts during today's office visit.  Over 50% of this time was spent counseling the patient and/or coordinating care regarding the sequelae of facial paralysis and the morbidity associated with facial nerve dysfunction. The time includes reviewing past injections records, discussing effects and adapting to needs for treatment. The injection time is not included in this time and was a separate 7 minutes.  See note for details.

## 2025-01-29 NOTE — LETTER
1/29/2025       RE: Alisson Roberts  [address]  [facility] [address]     Dear Colleague,    Thank you for referring your patient, Alisson Roberts, to the [facility] [place] at [facility]. Please see a copy of my visit note below.    Facial Plastic and Reconstructive Surgery      Alisson Roberts presents today for evaluation of facial spasm. The patient has had the morbidity of facial nerve dysfunction and has significant morbidity, tightness and discomfort. Involuntary movement is also present around the eye and mouth which leads to discomfort, tightness and fatigue. We have discussed the hemifacial spasm and discomfort and chemodenervation as therapy. Current symptoms are a sequelae of the facial nerve injury. There are  involuntary and unwanted movements of the face that hinder good and functional intended movements.     She has cataract surgery and feels her vision is improved, however she still feels like the right involuntary movements affect her vision.     PMH, PSH, meds and allergies are unchanged.    On exam there is a narrow right palpebral fissure. The eye closes involuntarily with speech. There is facial tightness and neck muscle spasms with smiling. Facial muscle excursion is limited due to the tightness. Oral commissure excursion on the right is approx 80% of normal. The contralateral side has compensatory hypercontracture and hyperfunction.     Assessment:  Hemifacial spasm  Spasmodic torticollis  Blepharospasm  Seventh cranial nerve disorder     Plan:   Chemodenervation of eye, face and neck today, bilateral  Continues to have benefits from treatment  We adapted treatment today based on symptoms and exam.      Procedure: Chemodenervation with Botulinum Toxin A  Indication: Hemifacial Spasm and Hypercontracture  Injector: Alayna Lai MD      Informed consent was obtained.  The skin was cleaned with  antimicrobial solution and a topical ice was placed.     The patient was asked to systematically engage the muscles in the area to be injected. The tuberculin needles were used for subdermal injection and hemostasis was obtained with light digital pressure when needed. The skin was cleaned.     A total of 70 units were injected.  Botulinum toxin A type: Botox  NDC [id number]  Waste 30 units       Please see procedure log.    The patient tolerated the procedure well and there were no complications. Post procedure care instructions were given to the patient.      I spent a total of 10 minutes face-to-face with Alisson Roberts during today's office visit.  Over 50% of this time was spent counseling the patient and/or coordinating care regarding the sequelae of facial paralysis and the morbidity associated with facial nerve dysfunction. The time includes reviewing past injections records, discussing effects and adapting to needs for treatment. The injection time is not included in this time and was a separate 7 minutes.  See note for details.      Again, thank you for allowing me to participate in the care of your patient.      Sincerely,    Alayna Lai MD

## 2025-03-05 ENCOUNTER — OFFICE VISIT (OUTPATIENT)
Dept: URBAN - METROPOLITAN AREA CLINIC 51 | Facility: CLINIC | Age: 80
End: 2025-03-05
Payer: MEDICARE

## 2025-03-05 DIAGNOSIS — H53.143 VISUAL DISCOMFORT, BILATERAL: Primary | ICD-10-CM

## 2025-03-05 DIAGNOSIS — H35.373 PUCKERING OF MACULA, BILATERAL: ICD-10-CM

## 2025-03-05 DIAGNOSIS — D31.32 BENIGN NEOPLASM OF LEFT CHOROID: ICD-10-CM

## 2025-03-05 DIAGNOSIS — H04.123 TEAR FILM INSUFFICIENCY OF BILATERAL LACRIMAL GLANDS: ICD-10-CM

## 2025-03-05 DIAGNOSIS — H26.493 OTHER SECONDARY CATARACT, BILATERAL: ICD-10-CM

## 2025-03-05 ASSESSMENT — VISUAL ACUITY
OD: 20/20
OS: 20/25

## 2025-03-05 ASSESSMENT — KERATOMETRY
OS: 43.88
OD: 43.75

## 2025-03-05 ASSESSMENT — INTRAOCULAR PRESSURE
OS: 17
OD: 17

## 2025-05-21 ENCOUNTER — OFFICE VISIT (OUTPATIENT)
Dept: OTOLARYNGOLOGY | Facility: CLINIC | Age: 80
End: 2025-05-21
Payer: COMMERCIAL

## 2025-05-21 VITALS — WEIGHT: 119 LBS | BODY MASS INDEX: 21.09 KG/M2 | HEIGHT: 63 IN

## 2025-05-21 DIAGNOSIS — G24.3 SPASMODIC TORTICOLLIS: Primary | ICD-10-CM

## 2025-05-21 DIAGNOSIS — G24.5 BLEPHAROSPASM OF RIGHT EYE: ICD-10-CM

## 2025-05-21 DIAGNOSIS — G51.9 SEVENTH CRANIAL NERVE DISEASE OR SYNDROME: ICD-10-CM

## 2025-05-21 DIAGNOSIS — G51.31 HEMIFACIAL SPASM OF RIGHT SIDE OF FACE: ICD-10-CM

## 2025-05-21 NOTE — LETTER
5/21/2025       RE: Alisson Roberts  [address] [place]  [facility] [address]     Dear Colleague,    Thank you for referring your patient, Alisson Roberts, to the [facility] [place] at [facility]. Please see a copy of my visit note below.    Facial Plastic and Reconstructive Surgery      Alisson Roberts presents today for evaluation of facial spasm. The patient has had the morbidity of facial nerve dysfunction and has significant morbidity, tightness and discomfort. Involuntary movement is also present around the eye and mouth which leads to discomfort, tightness and fatigue. We have discussed the hemifacial spasm and discomfort and chemodenervation as therapy. Current symptoms are a sequelae of the facial nerve injury. There are  involuntary and unwanted movements of the face that hinder good and functional intended movements.     PMH, PSH, meds and allergies are unchanged.    On exam there is a narrow right palpebral fissure. The eye closes involuntarily with speech. There is facial tightness and neck muscle spasms with smiling. Facial muscle excursion is limited due to the tightness. Oral commissure excursion on the right is approx 80% of normal. The contralateral side has compensatory hypercontracture and hyperfunction.     Assessment:  Hemifacial spasm  Spasmodic torticollis  Blepharospasm  Seventh cranial nerve disorder     Plan:   Chemodenervation of eye, face and neck today, bilateral  Continues to have benefits from treatment  We adapted treatment today based on symptoms and exam.      Procedure: Chemodenervation with Botulinum Toxin A  Indication: Hemifacial Spasm and Hypercontracture  Injector: Alayna Lai MD      Informed consent was obtained.  The skin was cleaned with antimicrobial solution and a topical ice was placed.     The patient was asked to systematically engage the muscles in the area to be injected.  The tuberculin needles were used for subdermal injection and hemostasis was obtained with light digital pressure when needed. The skin was cleaned.     A total of 70 units were injected.  Botulinum toxin A type: Botox  Waste 30 units   NDC [id number]      Please see procedure log.    The patient tolerated the procedure well and there were no complications. Post procedure care instructions were given to the patient.      I spent a total of 10 minutes face-to-face with Alisson Roberts during today's office visit.  Over 50% of this time was spent counseling the patient and/or coordinating care regarding the sequelae of facial paralysis and the morbidity associated with facial nerve dysfunction. The time includes reviewing past injections records, discussing effects and adapting to needs for treatment. The injection time is not included in this time and was a separate 7 minutes.  See note for details.      Again, thank you for allowing me to participate in the care of your patient.      Sincerely,    Alayna Lai MD

## 2025-05-21 NOTE — PROGRESS NOTES
Facial Plastic and Reconstructive Surgery      Alisson Roberts presents today for evaluation of facial spasm. The patient has had the morbidity of facial nerve dysfunction and has significant morbidity, tightness and discomfort. Involuntary movement is also present around the eye and mouth which leads to discomfort, tightness and fatigue. We have discussed the hemifacial spasm and discomfort and chemodenervation as therapy. Current symptoms are a sequelae of the facial nerve injury. There are  involuntary and unwanted movements of the face that hinder good and functional intended movements.     PMH, PSH, meds and allergies are unchanged.    On exam there is a narrow right palpebral fissure. The eye closes involuntarily with speech. There is facial tightness and neck muscle spasms with smiling. Facial muscle excursion is limited due to the tightness. Oral commissure excursion on the right is approx 80% of normal. The contralateral side has compensatory hypercontracture and hyperfunction.     Assessment:  Hemifacial spasm  Spasmodic torticollis  Blepharospasm  Seventh cranial nerve disorder     Plan:   Chemodenervation of eye, face and neck today, bilateral  Continues to have benefits from treatment  We adapted treatment today based on symptoms and exam.      Procedure: Chemodenervation with Botulinum Toxin A  Indication: Hemifacial Spasm and Hypercontracture  Injector: Alayna Lai MD      Informed consent was obtained.  The skin was cleaned with antimicrobial solution and a topical ice was placed.     The patient was asked to systematically engage the muscles in the area to be injected. The tuberculin needles were used for subdermal injection and hemostasis was obtained with light digital pressure when needed. The skin was cleaned.     A total of 70 units were injected.  Botulinum toxin A type: Botox  Waste 30 units   NDC [id number]      Please see procedure log.    The patient tolerated the procedure well  and there were no complications. Post procedure care instructions were given to the patient.      I spent a total of 10 minutes face-to-face with Alisson Roberts during today's office visit.  Over 50% of this time was spent counseling the patient and/or coordinating care regarding the sequelae of facial paralysis and the morbidity associated with facial nerve dysfunction. The time includes reviewing past injections records, discussing effects and adapting to needs for treatment. The injection time is not included in this time and was a separate 7 minutes.  See note for details.

## 2025-08-20 ENCOUNTER — OFFICE VISIT (OUTPATIENT)
Dept: OTOLARYNGOLOGY | Facility: CLINIC | Age: 80
End: 2025-08-20
Payer: COMMERCIAL

## 2025-08-20 VITALS — BODY MASS INDEX: 21.09 KG/M2 | WEIGHT: 119 LBS | HEIGHT: 63 IN

## 2025-08-20 DIAGNOSIS — G51.9 SEVENTH CRANIAL NERVE DISEASE OR SYNDROME: ICD-10-CM

## 2025-08-20 DIAGNOSIS — G24.5 BLEPHAROSPASM OF RIGHT EYE: ICD-10-CM

## 2025-08-20 DIAGNOSIS — G51.31 HEMIFACIAL SPASM OF RIGHT SIDE OF FACE: ICD-10-CM

## 2025-08-20 DIAGNOSIS — G24.3 SPASMODIC TORTICOLLIS: Primary | ICD-10-CM

## 2025-08-20 PROCEDURE — 64612 DESTROY NERVE FACE MUSCLE: CPT | Mod: 50 | Performed by: OTOLARYNGOLOGY

## 2025-08-20 PROCEDURE — 64616 CHEMODENERV MUSC NECK DYSTON: CPT | Mod: RT | Performed by: OTOLARYNGOLOGY

## 2025-08-20 PROCEDURE — 99212 OFFICE O/P EST SF 10 MIN: CPT | Mod: 25 | Performed by: OTOLARYNGOLOGY

## 2025-08-21 ENCOUNTER — TELEPHONE (OUTPATIENT)
Dept: OTOLARYNGOLOGY | Facility: CLINIC | Age: 80
End: 2025-08-21
Payer: COMMERCIAL